# Patient Record
Sex: MALE | Race: WHITE | Employment: OTHER | URBAN - METROPOLITAN AREA
[De-identification: names, ages, dates, MRNs, and addresses within clinical notes are randomized per-mention and may not be internally consistent; named-entity substitution may affect disease eponyms.]

---

## 2017-07-13 DIAGNOSIS — E78.5 HYPERLIPIDEMIA LDL GOAL <100: ICD-10-CM

## 2017-07-13 RX ORDER — SIMVASTATIN 10 MG
10 TABLET ORAL AT BEDTIME
Qty: 90 TABLET | Status: CANCELLED | OUTPATIENT
Start: 2017-07-13

## 2017-07-13 NOTE — TELEPHONE ENCOUNTER
Simvastatin 10 mg     Last Written Prescription Date: 5/2/16  Last Fill Quantity: 90, # refills: 3  Last Office Visit with [facility], [facility] or [facility] prescribing provider: 5/2/16  Next 5 appointments (look out 90 days)     Jul 24, 2017  3:40 PM CDT   SHORT with Tiburcio Landers MD   [facility] ([facility])    [address] [address]   [phone]                   Lab Results   Component Value Date    CHOL 124 05/27/2015     Lab Results   Component Value Date    HDL 34 05/27/2015     Lab Results   Component Value Date    LDL 75 05/27/2015     Lab Results   Component Value Date    TRIG 77 05/27/2015     Lab Results   Component Value Date    CHOLHDLRATIO 3.6 05/27/2015

## 2017-07-14 NOTE — TELEPHONE ENCOUNTER
Simvastatin       Last Written Prescription Date: 05/02/16  Last Fill Quantity: 90, # refills: 3    Last Office Visit with G, P or [facility] prescribing provider:  05/02/16   Future Office Visit:    Next 5 appointments (look out 90 days)     Jul 24, 2017  3:40 PM CDT   SHORT with Tiburcio Landers MD   [facility]    [address] [address]   [phone]                  Cholesterol   Date Value Ref Range Status   05/27/2015 124 <200 mg/dL Final     Comment:     LDL Cholesterol is the primary guide to therapy.   The NCEP recommends further evaluation of: patients with cholesterol greater   than 200 mg/dL if additional risk factors are present, cholesterol greater   than   240 mg/dL, triglycerides greater than 150 mg/dL, or HDL less than 40 mg/dL.       HDL Cholesterol   Date Value Ref Range Status   05/27/2015 34 (L) >40 mg/dL Final     LDL Cholesterol Calculated   Date Value Ref Range Status   05/27/2015 75 0 - 129 mg/dL Final     Comment:     LDL Cholesterol is the primary guide to therapy: LDL-cholesterol goal in high   risk patients is <100 mg/dL and in very high risk patients is <70 mg/dL.       Triglycerides   Date Value Ref Range Status   05/27/2015 77 0 - 150 mg/dL Final     Cholesterol/HDL Ratio   Date Value Ref Range Status   05/27/2015 3.6 0.0 - 5.0 Final     ALT   Date Value Ref Range Status   05/27/2015 35 0 - 70 U/L Final

## 2017-07-17 RX ORDER — SIMVASTATIN 10 MG
TABLET ORAL
Qty: 10 TABLET | Refills: 0 | Status: SHIPPED | OUTPATIENT
Start: 2017-07-17 | End: 2017-07-24

## 2017-07-17 NOTE — TELEPHONE ENCOUNTER
Pt is calling wanting his medication filled today   He is going out of town  Please advise   Poonam Rutherford  Clinic [facility]

## 2017-07-18 NOTE — TELEPHONE ENCOUNTER
Patient due for appt with provider and fasting labs  Called patient, phone busy signal  Recall    Dixie RIVERO Rn

## 2017-07-18 NOTE — TELEPHONE ENCOUNTER
Message has bee left for the pt to return a call back to the clinic.   Please given the below information.     Katie Macias RN

## 2017-07-24 ENCOUNTER — TELEPHONE (OUTPATIENT)
Dept: LAB | Facility: CLINIC | Age: 77
End: 2017-07-24
Payer: COMMERCIAL

## 2017-07-24 ENCOUNTER — OFFICE VISIT (OUTPATIENT)
Dept: FAMILY MEDICINE | Facility: CLINIC | Age: 77
End: 2017-07-24
Payer: COMMERCIAL

## 2017-07-24 VITALS
HEART RATE: 70 BPM | WEIGHT: 211 LBS | HEIGHT: 71 IN | DIASTOLIC BLOOD PRESSURE: 86 MMHG | TEMPERATURE: 97.7 F | BODY MASS INDEX: 29.54 KG/M2 | SYSTOLIC BLOOD PRESSURE: 154 MMHG

## 2017-07-24 DIAGNOSIS — F41.1 GENERALIZED ANXIETY DISORDER: ICD-10-CM

## 2017-07-24 DIAGNOSIS — E78.5 HYPERLIPIDEMIA LDL GOAL <100: ICD-10-CM

## 2017-07-24 DIAGNOSIS — I10 ESSENTIAL HYPERTENSION: ICD-10-CM

## 2017-07-24 DIAGNOSIS — E78.5 HYPERLIPIDEMIA LDL GOAL <100: Primary | ICD-10-CM

## 2017-07-24 LAB
ALT SERPL W P-5'-P-CCNC: 21 U/L (ref 0–70)
AST SERPL W P-5'-P-CCNC: 16 U/L (ref 0–45)
CHOLEST SERPL-MCNC: 169 MG/DL
CREAT SERPL-MCNC: 1.6 MG/DL (ref 0.66–1.25)
GFR SERPL CREATININE-BSD FRML MDRD: 42 ML/MIN/1.7M2
GLUCOSE SERPL-MCNC: 100 MG/DL (ref 70–99)
HDLC SERPL-MCNC: 42 MG/DL
LDLC SERPL CALC-MCNC: 102 MG/DL
NONHDLC SERPL-MCNC: 127 MG/DL
POTASSIUM SERPL-SCNC: 3.7 MMOL/L (ref 3.4–5.3)
TRIGL SERPL-MCNC: 124 MG/DL

## 2017-07-24 PROCEDURE — 82947 ASSAY GLUCOSE BLOOD QUANT: CPT | Performed by: FAMILY MEDICINE

## 2017-07-24 PROCEDURE — 84460 ALANINE AMINO (ALT) (SGPT): CPT | Performed by: FAMILY MEDICINE

## 2017-07-24 PROCEDURE — 80061 LIPID PANEL: CPT | Performed by: FAMILY MEDICINE

## 2017-07-24 PROCEDURE — 99214 OFFICE O/P EST MOD 30 MIN: CPT | Performed by: FAMILY MEDICINE

## 2017-07-24 PROCEDURE — 36415 COLL VENOUS BLD VENIPUNCTURE: CPT | Performed by: FAMILY MEDICINE

## 2017-07-24 PROCEDURE — 84132 ASSAY OF SERUM POTASSIUM: CPT | Performed by: FAMILY MEDICINE

## 2017-07-24 PROCEDURE — 84450 TRANSFERASE (AST) (SGOT): CPT | Performed by: FAMILY MEDICINE

## 2017-07-24 PROCEDURE — 82565 ASSAY OF CREATININE: CPT | Performed by: FAMILY MEDICINE

## 2017-07-24 RX ORDER — PAROXETINE 10 MG/1
10 TABLET, FILM COATED ORAL AT BEDTIME
Qty: 90 TABLET | Refills: 3 | Status: SHIPPED | OUTPATIENT
Start: 2017-07-24 | End: 2018-08-10

## 2017-07-24 RX ORDER — SIMVASTATIN 10 MG
TABLET ORAL
Qty: 90 TABLET | Refills: 3 | Status: SHIPPED | OUTPATIENT
Start: 2017-07-24 | End: 2018-07-17

## 2017-07-24 RX ORDER — LISINOPRIL 10 MG/1
10 TABLET ORAL DAILY
Qty: 90 TABLET | Refills: 3 | Status: SHIPPED | OUTPATIENT
Start: 2017-07-24 | End: 2018-08-10

## 2017-07-24 RX ORDER — HYDROCHLOROTHIAZIDE 50 MG/1
50 TABLET ORAL DAILY
Qty: 90 TABLET | Refills: 3 | Status: SHIPPED | OUTPATIENT
Start: 2017-07-24 | End: 2018-08-10

## 2017-07-24 RX ORDER — LORAZEPAM 0.5 MG/1
0.5 TABLET ORAL EVERY 6 HOURS PRN
Qty: 20 TABLET | Refills: 1 | Status: SHIPPED | OUTPATIENT
Start: 2017-07-24 | End: 2018-08-10

## 2017-07-24 ASSESSMENT — ANXIETY QUESTIONNAIRES
2. NOT BEING ABLE TO STOP OR CONTROL WORRYING: NOT AT ALL
6. BECOMING EASILY ANNOYED OR IRRITABLE: SEVERAL DAYS
5. BEING SO RESTLESS THAT IT IS HARD TO SIT STILL: NOT AT ALL
3. WORRYING TOO MUCH ABOUT DIFFERENT THINGS: NOT AT ALL
7. FEELING AFRAID AS IF SOMETHING AWFUL MIGHT HAPPEN: NOT AT ALL
1. FEELING NERVOUS, ANXIOUS, OR ON EDGE: NOT AT ALL
GAD7 TOTAL SCORE: 1

## 2017-07-24 ASSESSMENT — PATIENT HEALTH QUESTIONNAIRE - PHQ9: 5. POOR APPETITE OR OVEREATING: NOT AT ALL

## 2017-07-24 NOTE — TELEPHONE ENCOUNTER
Discussed this with Dr. Landers.  Lab orders were placed along with a glucose level.  Called lab to let them know the orders were placed.    Liana Macdonald, CMA

## 2017-07-24 NOTE — NURSING NOTE
"Chief Complaint   Patient presents with     Lipids     Recheck on lipid panel done today.     Hypertension     Recheck on blood pressure.     Anxiety     Recheck on anxiety.       Initial BP (!) 159/99  Pulse 70  Temp 97.7  F (36.5  C) (Tympanic)  Ht 5' 10.5\" (1.791 m)  Wt 211 lb (95.7 kg)  BMI 29.85 kg/m2 Estimated body mass index is 29.85 kg/(m^2) as calculated from the following:    Height as of this encounter: 5' 10.5\" (1.791 m).    Weight as of this encounter: 211 lb (95.7 kg).  Medication Reconciliation: complete  "

## 2017-07-24 NOTE — NURSING NOTE
"Chief Complaint   Patient presents with     Lipids     Recheck on lipid panel done today.     Hypertension     Recheck on blood pressure.     Anxiety     Recheck on anxiety.     Colonoscopy     He declines the colonoscopy.       Initial /86  Pulse 70  Temp 97.7  F (36.5  C) (Tympanic)  Ht 5' 10.5\" (1.791 m)  Wt 211 lb (95.7 kg)  BMI 29.85 kg/m2 Estimated body mass index is 29.85 kg/(m^2) as calculated from the following:    Height as of this encounter: 5' 10.5\" (1.791 m).    Weight as of this encounter: 211 lb (95.7 kg).  Medication Reconciliation: complete  "

## 2017-07-24 NOTE — MR AVS SNAPSHOT
After Visit Summary   7/24/2017    Herson Mondragon    MRN: [id number]           Patient Information     Date Of Birth          1940        Visit Information        Provider Department      7/24/2017 3:40 PM Tiburcio Landers MD [facility]        Today's Diagnoses     Hyperlipidemia LDL goal <100        Essential hypertension        Generalized anxiety disorder          Care Instructions    (E78.5) Hyperlipidemia LDL goal <100  Comment:   Plan: simvastatin (ZOCOR) 10 MG tablet, **Lipid panel        reflex to direct LDL FUTURE anytime, **ALT         FUTURE anytime        Stay on the lower chol diet and exercise daily. The LDL was 102 and the goal is under 130. Refill and recheck in one year if doing well.     (I10) Essential hypertension  Comment:   Plan: hydrochlorothiazide (HYDRODIURIL) 50 MG tablet,        lisinopril (PRINIVIL/ZESTRIL) 10 MG tablet,         **Creatinine FUTURE anytime, **Potassium FUTURE        anytime, **Glucose FUTURE anytime        We discussed the BP and the goal for the average at rest is under 130/80. We will increase Lisinopril from 5 mg to 10 mg daily now. Call if any side effects.   Monitor and record the BP. Stay on the Hydrochlorothiazide at 50 mg daily. If the BP is not at the goal in 2-3 weeks then call the clinic RN at [phone] and the Lisinopril will go to 20 mg daily.     (F41.1) Generalized anxiety disorder  Comment:   Plan: PARoxetine (PAXIL) 10 MG tablet, LORazepam         (ATIVAN) 0.5 MG tablet  For the Ativan for anxiety, this is used rarely and the written Rx for #20 and one refill is done today. This usually lasts for one year. Avoid stimulants.   For the Paxil we discussed the dose and will reduce this from 20 mg to 10 mg daily now. Monitor for the symptoms for the next few weeks and if worse in any way then call our RN  And they will do the phone PHQ/YVAN, and compare to today. Use the non drug therapies.           Follow-ups after your  "visit        Future tests that were ordered for you today     Open Future Orders        Priority Expected Expires Ordered    **Creatinine FUTURE anytime Routine 7/24/2017 7/24/2018 7/24/2017    **Potassium FUTURE anytime Routine 7/24/2017 7/24/2018 7/24/2017    **Glucose FUTURE anytime Routine 7/24/2017 7/24/2018 7/24/2017    **Lipid panel reflex to direct LDL FUTURE anytime Routine 7/24/2017 7/24/2018 7/24/2017    **ALT FUTURE anytime Routine 7/24/2017 7/24/2018 7/24/2017            Who to contact     If you have questions or need follow up information about today's clinic visit or your schedule please contact [facility] directly at [phone].  Normal or non-critical lab and imaging results will be communicated to you by [employer], letter or phone within 4 business days after the clinic has received the results. If you do not hear from us within 7 days, please contact the clinic through [employer] or phone. If you have a critical or abnormal lab result, we will notify you by phone as soon as possible.  Submit refill requests through [employer] or call your pharmacy and they will forward the refill request to us. Please allow 3 business days for your refill to be completed.          Additional Information About Your Visit        [employer] Information     [employer] gives you secure access to your electronic health record. If you see a primary care provider, you can also send messages to your care team and make appointments. If you have questions, please call your primary care clinic.  If you do not have a primary care provider, please call [phone] and they will assist you.        Care EveryWhere ID     This is your Care EveryWhere ID. This could be used by other organizations to access your [place] medical records  [id number]        Your Vitals Were     Pulse Temperature Height BMI (Body Mass Index)          70 97.7  F (36.5  C) (Tympanic) 5' 10.5\" (1.791 m) 29.85 kg/m2         Blood Pressure from Last 3 " Encounters:   07/24/17 (!) 159/99   05/02/16 118/69   06/01/15 134/80    Weight from Last 3 Encounters:   07/24/17 211 lb (95.7 kg)   05/02/16 214 lb (97.1 kg)   06/01/15 211 lb (95.7 kg)                 Today's Medication Changes          These changes are accurate as of: 7/24/17  4:25 PM.  If you have any questions, ask your nurse or doctor.               These medicines have changed or have updated prescriptions.        Dose/Directions    lisinopril 10 MG tablet   Commonly known as:  PRINIVIL/ZESTRIL   This may have changed:    - medication strength  - how much to take   Used for:  Essential hypertension   Changed by:  Tiburcio Landers MD        Dose:  10 mg   Take 1 tablet (10 mg) by mouth daily   Quantity:  90 tablet   Refills:  3       PARoxetine 10 MG tablet   Commonly known as:  PAXIL   This may have changed:    - medication strength  - how much to take   Used for:  Generalized anxiety disorder   Changed by:  Tiburcio Landers MD        Dose:  10 mg   Take 1 tablet (10 mg) by mouth At Bedtime   Quantity:  90 tablet   Refills:  3       simvastatin 10 MG tablet   Commonly known as:  ZOCOR   This may have changed:  See the new instructions.   Used for:  Hyperlipidemia LDL goal <100   Changed by:  Tiburcio Landers MD        TAKE ONE TABLET BY MOUTH AT BEDTIME   Quantity:  90 tablet   Refills:  3            Where to get your medicines      These medications were sent to [facility] [address] [place] [address] [address]   [address] [facility] [address]     Phone:  [phone]     hydrochlorothiazide 50 MG tablet    lisinopril 10 MG tablet    PARoxetine 10 MG tablet    simvastatin 10 MG tablet         Some of these will need a paper prescription and others can be bought over the counter.  Ask your nurse if you have questions.     Bring a paper prescription for each of these medications     LORazepam 0.5 MG tablet                Primary Care Provider Office Phone # Fax #    Tiburcio Acosta  MD Anshul [phone]       [facility] [address] [address]        Equal Access to Services     DEANDRE MCKEON : Lily Pantoja, wakamille wall, junta kadaneda beth, kristopher forrestin hayaavanita mcmahonlorenzo selwynramón yessica oden. So [facility] [phone].    ATENCIÓN: Si habla español, tiene a donis disposición servicios gratuitos de asistencia lingüística. Llame al [phone].    We comply with applicable federal civil rights laws and [place] laws. We do not discriminate on the basis of race, color, national origin, age, disability sex, sexual orientation or gender identity.            Thank you!     Thank you for choosing [facility]  for your care. Our goal is always to provide you with excellent care. Hearing back from our patients is one way we can continue to improve our services. Please take a few minutes to complete the written survey that you may receive in the mail after your visit with us. Thank you!             Your Updated Medication List - Protect others around you: Learn how to safely use, store and throw away your medicines at www.disposemymeds.org.          This list is accurate as of: 7/24/17  4:25 PM.  Always use your most recent med list.                   Brand Name Dispense Instructions for use Diagnosis    fish oil-omega-3 fatty acids 1000 MG capsule      Take 2 g by mouth every morning.        fluticasone 50 MCG/ACT spray    FLONASE    16 g    Spray 1-2 sprays into both nostrils daily    Chronic cough       hydrochlorothiazide 50 MG tablet    HYDRODIURIL    90 tablet    Take 1 tablet (50 mg) by mouth daily    Essential hypertension       lisinopril 10 MG tablet    PRINIVIL/ZESTRIL    90 tablet    Take 1 tablet (10 mg) by mouth daily    Essential hypertension       LORazepam 0.5 MG tablet    ATIVAN    20 tablet    Take 1 tablet (0.5 mg) by mouth every 6 hours as needed for anxiety (For flying.)    Generalized anxiety disorder       ONE  DAILY MENS Tabs      Take 1 tablet by mouth daily.        PARoxetine 10 MG tablet    PAXIL    90 tablet    Take 1 tablet (10 mg) by mouth At Bedtime    Generalized anxiety disorder       simvastatin 10 MG tablet    ZOCOR    90 tablet    TAKE ONE TABLET BY MOUTH AT BEDTIME    Hyperlipidemia LDL goal <100

## 2017-07-24 NOTE — PROGRESS NOTES
SUBJECTIVE:                                                    Herson Mondragon is a 77 year old male who presents to clinic today for the following health issues:      Hyperlipidemia Follow-Up      Rate your low fat/cholesterol diet?: good    Taking statin?  Yes, no muscle aches from statin    Other lipid medications/supplements?:  Fish oil/Omega 3, dose  without side effects  Lab Results   Component Value Date    CHOL 169 07/24/2017     Lab Results   Component Value Date    HDL 42 07/24/2017     Lab Results   Component Value Date     07/24/2017     Lab Results   Component Value Date    TRIG 124 07/24/2017     Lab Results   Component Value Date    CHOLHDLRATIO 3.6 05/27/2015       Hypertension Follow-up  Patient presents for evaluation of hypertension.  He indicates that he is feeling well and denies any symptoms referable to his elevated blood pressure. Specifically denies chest pain, palpitations, dyspnea, orthopnea, PND or peripheral edema. Current medication regimen is as listed below. Patient denies any side effects of medication. His average BP has been 135/85    Does have a cough, see below.      Outpatient blood pressures are being checked at home.  Results are usually around 135/85.    Low Salt Diet: no added salt    Medication Followup of Anxiety    Taking Medication as prescribed: yes    Side Effects:  None    Medication Helping Symptoms:  Yes, he would like to discuss about decreasing the dose to 10 mg daily.    Today the PHQ is 2, and the YVAN is 1.     YVAN-7   [employer], 2002; Used with Permission) 5/2/2016   1. Feeling nervous, anxious, or on edge 0   2. Not being able to stop or control worrying 0   3. Worrying too much about different things 0   4. Trouble relaxing 1   5. Being so restless that it is hard to sit still 0   6. Becoming easily annoyed or irritable 0   7. Feeling afraid, as if something awful might happen 0   YVAN-7 Total Score 1     PHQ-9 ([employer]) 6/18/2015   No Interest In  Doing Things 0   Feeling Depressed 0   Trouble Sleeping 1   Tired / No Energy 0   No appetite or Over-Eating 0   Feeling Bad about Self 0   Trouble Concentrating 0   Moving Slow or Restless 0   Suicidal Thoughts 0   Total Score 1        COUGH:  Cough for the past 9 months along with sneezing.    He does have Flonase on his medication list for a cough.  He states he doesn't remember getting this medication and is not using it.  He is concerned about his symptoms he is having.  Not sure if medication related?      BACK PAIN:  Here to discuss about back pain, right side.  It is off and on.  He can go 3-4 days with no symptoms and then will have symptoms.     Pain can radiate down the leg at times to the foot.  Can have a hard time walking and will have to rest.  States at times it feels like a bone pushes out in the back area and when pushing on that the pain can feel better.        Amount of exercise or physical activity: No daily exercise, is active around the house.    Problems taking medications regularly: No    Medication side effects: His wife is not sure if his cold symptoms could be related to cough he has had for 9 months.  Diet: low fat/cholesterol and no added salt.        Current Outpatient Prescriptions:      simvastatin (ZOCOR) 10 MG tablet, TAKE ONE TABLET BY MOUTH AT BEDTIME, Disp: 90 tablet, Rfl: 3     hydrochlorothiazide (HYDRODIURIL) 50 MG tablet, Take 1 tablet (50 mg) by mouth daily, Disp: 90 tablet, Rfl: 3     PARoxetine (PAXIL) 10 MG tablet, Take 1 tablet (10 mg) by mouth At Bedtime, Disp: 90 tablet, Rfl: 3     lisinopril (PRINIVIL/ZESTRIL) 10 MG tablet, Take 1 tablet (10 mg) by mouth daily, Disp: 90 tablet, Rfl: 3     LORazepam (ATIVAN) 0.5 MG tablet, Take 1 tablet (0.5 mg) by mouth every 6 hours as needed for anxiety (For flying.), Disp: 20 tablet, Rfl: 1     Multiple Vitamins-Minerals (ONE DAILY MENS) TABS, Take 1 tablet by mouth daily., Disp: , Rfl:      fish oil-omega-3 fatty acids (FISH OIL)  "1000 MG capsule, Take 2 g by mouth every morning., Disp: , Rfl:      [DISCONTINUED] simvastatin (ZOCOR) 10 MG tablet, TAKE ONE TABLET BY MOUTH AT BEDTIME, Disp: 10 tablet, Rfl: 0     fluticasone (FLONASE) 50 MCG/ACT nasal spray, Spray 1-2 sprays into both nostrils daily (Patient not taking: Reported on 7/24/2017), Disp: 16 g, Rfl: 11     [DISCONTINUED] hydrochlorothiazide (HYDRODIURIL) 50 MG tablet, Take 1 tablet (50 mg) by mouth daily, Disp: 90 tablet, Rfl: 3     [DISCONTINUED] PARoxetine (PAXIL) 20 MG tablet, Take 1 tablet (20 mg) by mouth At Bedtime, Disp: 90 tablet, Rfl: 3     [DISCONTINUED] lisinopril (PRINIVIL,ZESTRIL) 5 MG tablet, Take 1 tablet (5 mg) by mouth daily, Disp: 90 tablet, Rfl: 3     [DISCONTINUED] LORazepam (ATIVAN) 0.5 MG tablet, Take 1 tablet (0.5 mg) by mouth every 6 hours as needed for anxiety (For flying.), Disp: 20 tablet, Rfl: 1    Patient Active Problem List   Diagnosis     Essential hypertension     Syncope and collapse     Atrial fibrillation (H)     Hyperlipidemia LDL goal <130     Generalized anxiety disorder     Shoulder arthritis     Sinus bradycardia       Blood pressure 154/86, pulse 70, temperature 97.7  F (36.5  C), temperature source Tympanic, height 5' 10.5\" (1.791 m), weight 211 lb (95.7 kg).    Exam:  GENERAL APPEARANCE: healthy, alert and no distress  EYES: EOMI,  PERRL  NECK: no adenopathy, no asymmetry, masses, or scars and thyroid normal to palpation  RESP: lungs clear to auscultation - no rales, rhonchi or wheezes  CV: regular rates and rhythm, normal S1 S2, no S3 or S4 and no murmur, click or rub -  MS: extremities normal- no gross deformities noted, no evidence of inflammation in joints, FROM in all extremities.  SKIN: no suspicious lesions or rashes  PSYCH: mentation appears normal and affect normal/bright      (E78.5) Hyperlipidemia LDL goal <100  Comment:   Plan: simvastatin (ZOCOR) 10 MG tablet, **Lipid panel        reflex to direct LDL FUTURE anytime, **ALT        "  FUTURE anytime        Stay on the lower chol diet and exercise daily. The LDL was 102 and the goal is under 130. Refill and recheck in one year if doing well.     (I10) Essential hypertension  Comment:   Plan: hydrochlorothiazide (HYDRODIURIL) 50 MG tablet,        lisinopril (PRINIVIL/ZESTRIL) 10 MG tablet,         **Creatinine FUTURE anytime, **Potassium FUTURE        anytime, **Glucose FUTURE anytime        We discussed the BP and the goal for the average at rest is under 130/80. We will increase Lisinopril from 5 mg to 10 mg daily now. Call if any side effects.   Monitor and record the BP. Stay on the Hydrochlorothiazide at 50 mg daily. If the BP is not at the goal in 2-3 weeks then call the clinic RN at [phone] and the Lisinopril will go to 20 mg daily.     (F41.1) Generalized anxiety disorder  Comment:   Plan: PARoxetine (PAXIL) 10 MG tablet, LORazepam         (ATIVAN) 0.5 MG tablet  For the Ativan for anxiety, this is used rarely and the written Rx for #20 and one refill is done today. This usually lasts for one year. Avoid stimulants.   For the Paxil we discussed the dose and will reduce this from 20 mg to 10 mg daily now. Monitor for the symptoms for the next few weeks and if worse in any way then call our RN  And they will do the phone PHQ/YVAN, and compare to today. Use the non drug therapies.     Tiburcio Landers

## 2017-07-24 NOTE — PATIENT INSTRUCTIONS
(E78.5) Hyperlipidemia LDL goal <100  Comment:   Plan: simvastatin (ZOCOR) 10 MG tablet, **Lipid panel        reflex to direct LDL FUTURE anytime, **ALT         FUTURE anytime        Stay on the lower chol diet and exercise daily. The LDL was 102 and the goal is under 130. Refill and recheck in one year if doing well.     (I10) Essential hypertension  Comment:   Plan: hydrochlorothiazide (HYDRODIURIL) 50 MG tablet,        lisinopril (PRINIVIL/ZESTRIL) 10 MG tablet,         **Creatinine FUTURE anytime, **Potassium FUTURE        anytime, **Glucose FUTURE anytime        We discussed the BP and the goal for the average at rest is under 130/80. We will increase Lisinopril from 5 mg to 10 mg daily now. Call if any side effects.   Monitor and record the BP. Stay on the Hydrochlorothiazide at 50 mg daily. If the BP is not at the goal in 2-3 weeks then call the clinic RN at [phone] and the Lisinopril will go to 20 mg daily.     (F41.1) Generalized anxiety disorder  Comment:   Plan: PARoxetine (PAXIL) 10 MG tablet, LORazepam         (ATIVAN) 0.5 MG tablet  For the Ativan for anxiety, this is used rarely and the written Rx for #20 and one refill is done today. This usually lasts for one year. Avoid stimulants.   For the Paxil we discussed the dose and will reduce this from 20 mg to 10 mg daily now. Monitor for the symptoms for the next few weeks and if worse in any way then call our RN  And they will do the phone PHQ/YVAN, and compare to today. Use the non drug therapies.

## 2017-07-25 ASSESSMENT — PATIENT HEALTH QUESTIONNAIRE - PHQ9: SUM OF ALL RESPONSES TO PHQ QUESTIONS 1-9: 2

## 2017-07-25 ASSESSMENT — ANXIETY QUESTIONNAIRES: GAD7 TOTAL SCORE: 1

## 2017-08-14 ENCOUNTER — MYC MEDICAL ADVICE (OUTPATIENT)
Dept: FAMILY MEDICINE | Facility: CLINIC | Age: 77
End: 2017-08-14

## 2017-08-14 ENCOUNTER — OFFICE VISIT (OUTPATIENT)
Dept: FAMILY MEDICINE | Facility: CLINIC | Age: 77
End: 2017-08-14
Payer: COMMERCIAL

## 2017-08-14 VITALS
DIASTOLIC BLOOD PRESSURE: 95 MMHG | WEIGHT: 209.4 LBS | HEIGHT: 71 IN | TEMPERATURE: 99.2 F | BODY MASS INDEX: 29.31 KG/M2 | SYSTOLIC BLOOD PRESSURE: 164 MMHG | HEART RATE: 71 BPM

## 2017-08-14 DIAGNOSIS — G57.01 PIRIFORMIS SYNDROME, RIGHT: ICD-10-CM

## 2017-08-14 DIAGNOSIS — M54.31 SCIATICA OF RIGHT SIDE: Primary | ICD-10-CM

## 2017-08-14 PROCEDURE — 99214 OFFICE O/P EST MOD 30 MIN: CPT | Performed by: NURSE PRACTITIONER

## 2017-08-14 RX ORDER — TRAMADOL HYDROCHLORIDE 50 MG/1
50 TABLET ORAL EVERY 8 HOURS PRN
Qty: 20 TABLET | Refills: 0 | Status: SHIPPED | OUTPATIENT
Start: 2017-08-14 | End: 2017-10-13

## 2017-08-14 ASSESSMENT — PAIN SCALES - GENERAL: PAINLEVEL: WORST PAIN (10)

## 2017-08-14 NOTE — NURSING NOTE
"Chief Complaint   Patient presents with     Musculoskeletal Problem     Right Buttocks and leg Pain , pain radiates down leg into toes. Patient thinks it is a pinched nerve.     Health Maintenance     reminded due for prevnar 13, he will come back to get this done .        Initial BP (!) 164/95 (BP Location: Left arm, Patient Position: Chair, Cuff Size: Adult Large)  Pulse 71  Temp 99.2  F (37.3  C) (Tympanic)  Ht 5' 10.5\" (1.791 m)  Wt 209 lb 6.4 oz (95 kg)  BMI 29.62 kg/m2 Estimated body mass index is 29.62 kg/(m^2) as calculated from the following:    Height as of this encounter: 5' 10.5\" (1.791 m).    Weight as of this encounter: 209 lb 6.4 oz (95 kg).  Medication Reconciliation: complete    "

## 2017-08-14 NOTE — PROGRESS NOTES
"  SUBJECTIVE:                                                    Herson Mondragon is a 77 year old male who presents to clinic today for the following health issues:  Severe right leg pain which has been going on for over the last 6 months and worsening in the last 2 months. Patient reported of pain shooting down to ankle and has not been able to bear weight for the last 3-4 days. The pain is better with sitting down. Patient has been in bed for last 4 days and having trouble sleeping due to the pain. Patient had a tape on the right glute which patient mentions that if pressed had at that site pain subsides and patient is even able to walk few steps without pain. Patient also mentions that he feels something is bulging out at taped site on the glute with walking which is not present other side of the glute. Patient thinks that it is a pinched nerve. Patient has been using Advil with no help, but warm pack helps a little.       Joint Pain    Onset: 6 months, Started Getting Worse x 2 months    Description:   Location: Right Side Buttocks, Radiates Down Leg Into Toes   Character: Constant Sharp pain, Burning . Pain has gotten so bad he has almost \"passed out\"     Intensity: severe    Progression of Symptoms: worse    Accompanying Signs & Symptoms:  Other symptoms: radiation of pain to toes     History:   Previous similar pain: YES      Precipitating factors:   Trauma or overuse: no   Gets worse with changes in position. Hard for him to stand or walk     Alleviating factors:  Improved by: Pushing with his finger on a certain spot on his buttocks.    Therapies Tried and outcome: Heat- helps a little. Pain patches- alleviates pain a little. Advil 600mg - no relief. He states when he pushes on a certain area on his Right Buttocks The pain goes away .       Problem list and histories reviewed & adjusted, as indicated.  Additional history: as documented    Labs reviewed in EPIC    Reviewed and updated as needed this visit " [facility]  [facility]       Reviewed and updated as needed this visit by Provider         ROS:  Constitutional, HEENT, cardiovascular, pulmonary, gi and gu systems are negative, except as otherwise noted.      OBJECTIVE:   BP (!) 164/95 (BP Location: Left arm, Patient Position: Chair, Cuff Size: Adult Large)  Pulse 71  Temp 99.2  F (37.3  C) (Tympanic)  Ht 5' 10.5\" (1.791 m)  Wt 209 lb 6.4 oz (95 kg)  BMI 29.62 kg/m2  Body mass index is 29.62 kg/(m^2).  GENERAL: healthy, alert and no distress  MS: Painful bearing weight on right leg. Straight leg raise negative. Excessive right medial gluteal muscle laxity. Unable to perform the trendelenburg sign to evaluate gluteal muscle weakness since patient unable to bear weight on the right leg due to pain.   PSYCH: mentation appears normal, affect normal/bright    Diagnostic Test Results:  none     ASSESSMENT/PLAN:     1. Sciatica of right side  Painful bearing weight on left leg. Excessive left medial gluteal muscle laxity.Straight leg raise negative. Unable to perform the trendelenburg sign to evaluate gluteal muscle weakness but unable to perform due to unable to bear weight on left leg. Will try physical therapy. Offered short-term oral steroid but patient and spouse declined due to its side effects. Patient also requested for some stronger pain medications so that he can have a good night sleep.  - PHYSICAL THERAPY REFERRAL  - traMADol (ULTRAM) 50 MG tablet; Take 1 tablet (50 mg) by mouth every 8 hours as needed for pain (do not take together with Lorazepam) maximum 3 tablet(s) per day  Dispense: 20 tablet; Refill: 0    2. Piriformis syndrome, right  See above  - PHYSICAL THERAPY REFERRAL  - traMADol (ULTRAM) 50 MG tablet; Take 1 tablet (50 mg) by mouth every 8 hours as needed for pain (do not take together with Lorazepam) maximum 3 tablet(s) per day  Dispense: 20 tablet; Refill: 0  - Provided written education about piriformis syndrome  -if no " improvement recommend to follow up with ortho for possible steroid injection     See Patient Instructions    CANDICE Perez [facility]

## 2017-08-14 NOTE — MR AVS SNAPSHOT
"              After Visit Summary   8/14/2017    Herson Mondragon    MRN: [id number]           Patient Information     Date Of Birth          1940        Visit Information        Provider Department      8/14/2017 2:40 PM Dai Doherty APRN [facility]        Today's Diagnoses     Sciatica of right side    -  1      Care Instructions    Piriformis syndrome    Piriformis syndrome -- Piriformis syndrome is a controversial entrapment neuropathy (figure 14 and figure 15 and figure 11) [21,22]. Controversy is due to the limited research about the condition and the difficulty of making the diagnosis, particularly as symptoms mimic many other more common diagnoses. Piriformis syndrome may account for 0.3 to 6 percent of sciatic-like syndromes [23]. The sciatic nerve normally passes inferior to the piriformis muscle. Entrapment of the sciatic nerve may develop following trauma to the buttocks or piriformis muscle strain causing scarring and fibrosis around the nerve, or due to the structure of the piriformis, such as when branches of the nerve pass through a bifid piriformis muscle [24]. During downhill running or sprinting, the piriformis muscle undergoes eccentric contraction and some runners may develop the syndrome via this mechanism. The most common presenting symptom is buttock pain of gradual onset that increases with sitting [25]. The \"wallet sign\" associated with the syndrome is when a male patient finds he can no longer sit on his wallet without causing symptoms. Paresthesias may develop, but the classic radicular symptoms of sciatica are not common.  Clinically, the diagnosis of piriformis syndrome is considered when the classic signs of a lumbar radiculopathy elicited by provocative testing are absent, neurologic examination is normal, and other causes of gluteal and sacroiliac pain are ruled out. A provocative test (Freiburgs test) suggesting piriformis syndrome is performed by " placing the hip in extension and internal rotation, and then resisting external rotation. Pain or sciatic symptoms denotes a positive test [26]. Another test (Pace sign) involves having the seated patient resist abduction and external rotation. Pain and reproduction of symptoms marks a positive test. When necessary, plain radiographs and MRI of the hip and pelvis are obtained to rule out other causes of symptoms. EMG and nerve conduction studies are rarely positive in piriformis syndrome but can be useful for eliminating other diagnostic possibilities.  Treatment begins with physical therapy involving strengthening of the pelvic and hip region and stretching of the piriformis. Appropriate analgesics for neuropathic pain are taken as needed. Physical therapy is effective in the majority of cases [25]. Ultrasound-guided glucocorticoid injections have been beneficial in some cases, and botulin toxin injections have also been used. Surgery (typically a piriformis tenotomy) may be considered if symptoms are debilitating and persist despite conservative therapy.    Treatment physical therapy  Tramadol 50 mg 3 times daily as needed for pain  Tylenol 500 mg 4 times daily as needed         Thank you for choosing [facility].  You may be receiving a survey in the mail from [facility] regarding your visit today.  Please take a few minutes to complete and return the survey to let us know how we are doing.      If you have questions or concerns, please contact us via [facility] or you can contact your care team at [phone].    Our Clinic hours are:  Monday 6:40 am  to 7:00 pm  Tuesday -Friday 6:40 am to 5:00 pm    The [place] outpatient lab hours are:  Monday - Friday 6:10 am to 4:45 pm  Saturdays 7:00 am to 11:00 am  Appointments are required, call [phone]    If you have clinical questions after hours or would like to schedule an appointment,  call the clinic at [phone].            Follow-ups after your visit    "     Additional Services     PHYSICAL THERAPY REFERRAL       This therapy referral will be filtered to a centralized scheduling office at [facility] and the patient will receive a call to schedule an appointment at a [place] location most convenient for them.   [facility] provides Physical Therapy evaluation and treatment and many specialty services across the [place] system.  If requesting a specialty program, please choose from the list below.    If you have not heard from the scheduling office within 2 business days, please call [phone] for all locations, with the exception of [place], please call [phone].  Treatment: Evaluation & Treatment  Special Instructions/Modalities:   Special Programs: None    Please be aware that coverage of these services is subject to the terms and limitations of your health insurance plan.  Call member services at your health plan with any benefit or coverage questions.      **Note to Provider:  If you are referring outside of [place] for the therapy appointment, please list the name of the location in the \"special instructions\" above, print the referral and give to the patient to schedule the appointment.                  Who to contact     If you have questions or need follow up information about today's clinic visit or your schedule please contact [facility] directly at [phone].  Normal or non-critical lab and imaging results will be communicated to you by MyChart, letter or phone within 4 business days after the clinic has received the results. If you do not hear from us within 7 days, please contact the clinic through MyChart or phone. If you have a critical or abnormal lab result, we will notify you by phone as soon as possible.  Submit refill requests through [facility] or call your pharmacy and they will forward the refill request to us. Please allow 3 business days for your refill to be completed.          " "Additional Information About Your Visit        MyChart Information     [facility] gives you secure access to your electronic health record. If you see a primary care provider, you can also send messages to your care team and make appointments. If you have questions, please call your primary care clinic.  If you do not have a primary care provider, please call [phone] and they will assist you.        Care EveryWhere ID     This is your Care EveryWhere ID. This could be used by other organizations to access your [facility] medical records  [id number]        Your Vitals Were     Pulse Temperature Height BMI (Body Mass Index)          71 99.2  F (37.3  C) (Tympanic) 5' 10.5\" (1.791 m) 29.62 kg/m2         Blood Pressure from Last 3 Encounters:   08/14/17 (!) 164/95   07/24/17 154/86   05/02/16 118/69    Weight from Last 3 Encounters:   08/14/17 209 lb 6.4 oz (95 kg)   07/24/17 211 lb (95.7 kg)   05/02/16 214 lb (97.1 kg)              We Performed the Following     PHYSICAL THERAPY REFERRAL          Today's Medication Changes          These changes are accurate as of: 8/14/17  3:09 PM.  If you have any questions, ask your nurse or doctor.               Start taking these medicines.        Dose/Directions    traMADol 50 MG tablet   Commonly known as:  ULTRAM   Used for:  Sciatica of right side        Dose:  50 mg   Take 1 tablet (50 mg) by mouth every 8 hours as needed for pain (do not take together with Lorazepam) maximum 3 tablet(s) per day   Quantity:  20 tablet   Refills:  0            Where to get your medicines      Some of these will need a paper prescription and others can be bought over the counter.  Ask your nurse if you have questions.     Bring a paper prescription for each of these medications     traMADol 50 MG tablet                Primary Care Provider Office Phone # Fax #    Tiburcio Landers [phone] [address] [facility] [address]        Equal Access to Services     " [facility]RE [facility] : Hadii aad ku hay Sonaldo, waaxda luqadaha, qaybta kaalmada adejayda, kristopher wallace prietovanita grigsby mariliasoniay juan . So [facility] [phone].    ATENCIÓN: Si habla erin, tiene a donis disposición servicios gratuitos de asistencia lingüística. Llame al [phone].    We comply with applicable federal civil rights laws and [place] laws. We do not discriminate on the basis of race, color, national origin, age, disability sex, sexual orientation or gender identity.            Thank you!     Thank you for choosing [facility]  for your care. Our goal is always to provide you with excellent care. Hearing back from our patients is one way we can continue to improve our services. Please take a few minutes to complete the written survey that you may receive in the mail after your visit with us. Thank you!             Your Updated Medication List - Protect others around you: Learn how to safely use, store and throw away your medicines at www.disposemymeds.org.          This list is accurate as of: 8/14/17  3:09 PM.  Always use your most recent med list.                   Brand Name Dispense Instructions for use Diagnosis    fish oil-omega-3 fatty acids 1000 MG capsule      Take 2 g by mouth every morning.        hydrochlorothiazide 50 MG tablet    HYDRODIURIL    90 tablet    Take 1 tablet (50 mg) by mouth daily    Essential hypertension       lisinopril 10 MG tablet    PRINIVIL/ZESTRIL    90 tablet    Take 1 tablet (10 mg) by mouth daily    Essential hypertension       LORazepam 0.5 MG tablet    ATIVAN    20 tablet    Take 1 tablet (0.5 mg) by mouth every 6 hours as needed for anxiety (For flying.)    Generalized anxiety disorder       ONE DAILY MENS Tabs      Take 1 tablet by mouth daily.        PARoxetine 10 MG tablet    PAXIL    90 tablet    Take 1 tablet (10 mg) by mouth At Bedtime    Generalized anxiety disorder       simvastatin 10 MG tablet    ZOCOR    90 tablet    TAKE ONE TABLET BY MOUTH  AT BEDTIME    Hyperlipidemia LDL goal <100       traMADol 50 MG tablet    ULTRAM    20 tablet    Take 1 tablet (50 mg) by mouth every 8 hours as needed for pain (do not take together with Lorazepam) maximum 3 tablet(s) per day    Sciatica of right side

## 2017-08-14 NOTE — PATIENT INSTRUCTIONS
"Piriformis syndrome    Piriformis syndrome   Piriformis syndrome is a controversial entrapment neuropathy (figure 14 and figure 15 and figure 11) [21,22]. Controversy is due to the limited research about the condition and the difficulty of making the diagnosis, particularly as symptoms mimic many other more common diagnoses. Piriformis syndrome may account for 0.3 to 6 percent of sciatic-like syndromes [23]. The sciatic nerve normally passes inferior to the piriformis muscle. Entrapment of the sciatic nerve may develop following trauma to the buttocks or piriformis muscle strain causing scarring and fibrosis around the nerve, or due to the structure of the piriformis, such as when branches of the nerve pass through a bifid piriformis muscle [24]. During downhill running or sprinting, the piriformis muscle undergoes eccentric contraction and some runners may develop the syndrome via this mechanism. The most common presenting symptom is buttock pain of gradual onset that increases with sitting [25]. The \"wallet sign\" associated with the syndrome is when a male patient finds he can no longer sit on his wallet without causing symptoms. Paresthesias may develop, but the classic radicular symptoms of sciatica are not common.  Clinically, the diagnosis of piriformis syndrome is considered when the classic signs of a lumbar radiculopathy elicited by provocative testing are absent, neurologic examination is normal, and other causes of gluteal and sacroiliac pain are ruled out. A provocative test (Freiburgs test) suggesting piriformis syndrome is performed by placing the hip in extension and internal rotation, and then resisting external rotation. Pain or sciatic symptoms denotes a positive test [26]. Another test (Pace sign) involves having the seated patient resist abduction and external rotation. Pain and reproduction of symptoms marks a positive test. When necessary, plain radiographs and MRI of the hip and pelvis are " obtained to rule out other causes of symptoms. EMG and nerve conduction studies are rarely positive in piriformis syndrome but can be useful for eliminating other diagnostic possibilities.  Treatment begins with physical therapy involving strengthening of the pelvic and hip region and stretching of the piriformis. Appropriate analgesics for neuropathic pain are taken as needed. Physical therapy is effective in the majority of cases [25]. Ultrasound-guided glucocorticoid injections have been beneficial in some cases, and botulin toxin injections have also been used. Surgery (typically a piriformis tenotomy) may be considered if symptoms are debilitating and persist despite conservative therapy.    Treatment physical therapy  Tramadol 50 mg 3 times daily as needed for pain  Tylenol 500 mg 4 times daily as needed         Thank you for choosing [facility].  You may be receiving a survey in the mail from [employer] regarding your visit today.  Please take a few minutes to complete and return the survey to let us know how we are doing.      If you have questions or concerns, please contact us via [employer] or you can contact your care team at [phone].    Our Clinic hours are:  Monday 6:40 am  to 7:00 pm  Tuesday -Friday 6:40 am to 5:00 pm    The [place] outpatient lab hours are:  Monday - Friday 6:10 am to 4:45 pm  Saturdays 7:00 am to 11:00 am  Appointments are required, call [phone]    If you have clinical questions after hours or would like to schedule an appointment,  call the clinic at [phone].

## 2017-08-16 ENCOUNTER — ALLIED HEALTH/NURSE VISIT (OUTPATIENT)
Dept: FAMILY MEDICINE | Facility: CLINIC | Age: 77
End: 2017-08-16
Payer: COMMERCIAL

## 2017-08-16 ENCOUNTER — HOSPITAL ENCOUNTER (OUTPATIENT)
Dept: PHYSICAL THERAPY | Facility: CLINIC | Age: 77
Setting detail: THERAPIES SERIES
End: 2017-08-16
Attending: NURSE PRACTITIONER
Payer: MEDICARE

## 2017-08-16 DIAGNOSIS — M54.31 SCIATICA OF RIGHT SIDE: Primary | ICD-10-CM

## 2017-08-16 PROCEDURE — 40000718 ZZHC STATISTIC PT DEPARTMENT ORTHO VISIT: Performed by: PHYSICAL THERAPIST

## 2017-08-16 PROCEDURE — G8978 MOBILITY CURRENT STATUS: HCPCS | Mod: GP,CL | Performed by: PHYSICAL THERAPIST

## 2017-08-16 PROCEDURE — 97110 THERAPEUTIC EXERCISES: CPT | Mod: GP | Performed by: PHYSICAL THERAPIST

## 2017-08-16 PROCEDURE — 99207 ZZC NO CHARGE NURSE ONLY: CPT

## 2017-08-16 PROCEDURE — G8979 MOBILITY GOAL STATUS: HCPCS | Mod: GP,CI | Performed by: PHYSICAL THERAPIST

## 2017-08-16 PROCEDURE — 97140 MANUAL THERAPY 1/> REGIONS: CPT | Mod: GP | Performed by: PHYSICAL THERAPIST

## 2017-08-16 PROCEDURE — 97161 PT EVAL LOW COMPLEX 20 MIN: CPT | Mod: GP | Performed by: PHYSICAL THERAPIST

## 2017-08-16 NOTE — PROGRESS NOTES
Herson Mondragon   PHYSICAL THERAPY EVALUATION    08/16/17 1200   General Information   Type of Visit Initial OP Ortho PT Evaluation   Start of Care Date 08/16/17   Referring Physician Dai Doherty NP   Patient/Family Goals Statement decrease pain, be able to walk   Orders Evaluate and Treat   Date of Order 08/14/17   Insurance Type Medicare   Medical Diagnosis R sciatica   Surgical/Medical history reviewed Yes  (HTN)   Body Part(s)   Body Part(s) Lumbar Spine/SI   Presentation and Etiology   Pertinent history of current problem (include personal factors and/or comorbidities that impact the POC) R butt and thigh down to calf, sx immediately upon standing.  Several years ago had L LE sx.  Feels this is his muscle pushing on the sciatic nerve, if he pushes on it he can wlak little keeley. States the muscle actually bulges out, can feel it and pushes hard to push it back in. Sx better sitting, lying on side.  Sx standing, walking, turning over in bed.  Slowly progressing over 6 months, Sx increase bad 2 weeks ago.  Pt points to very low in buttock as site he pushes on when it is hurting, just above ischial tub  Pain 8-10/10 when bad   Onset date of current episode/exacerbation 08/01/17   Prior Level of Function   Functional Level Prior Comment cabin and home/ yard work, boats, put in docks   Current Level of Function   Patient role/employment history F. Retired   Living environment [place]   Current equipment-Gait/Locomotion Standard cane  (arrived sitting in wheelchair)   Fall Risk Screen   Fall screen completed by PT   Per patient - Fall 2 or more times in past year? No   Per patient - Fall with injury in past year? No   Is patient a fall risk? No   System Outcome Measures   Outcome Measures Low Back Pain (see [place] and José Miguel)   Lumbar Spine/SI Objective Findings   Posture stands WB L LE mostly, decreasedc lumbar lordosis, slighlty fwd bent posture   Gait/Locomotion slow and cautious once he knew pain  "was not present   Flexion % relieved sx, R butt pain moving back into upright position   Extension ROM 50% no increase in sx, pain moving back to upright position   Right Side Bending ROM 25% stiff, increased R butt pain   Left Side Bending ROM 25% stiff, no butt pain   Pelvic Screen appeared level supine   Hip Screen B hip ROM ER 25-30*, IR 10*, EXT 0* B   Hip Flexion (L2) Strength 5   Hip Abduction Strength 5   Lumbar/Hip/Knee/Foot Strength Comments hip ER in clam shell position 4+/5, no pain   Hamstring Flexibility 30* B   Hip Flexor Flexibility tight 0* B   Quadricep Flexibility tight , prone KF 90-95* B   Piriformis Flexibility WFL, no pain,    SLR neg   Crossover SLR neg   Neurological Testing Comments neural tension neg   Palpation no palpable tenderness, reports area of sacrotuberous ligament as closest site to \"where muscle bulges out when painful\"   Planned Therapy Interventions   Planned Therapy Interventions strengthening;stretching;manual therapy   Clinical Impression   Criteria for Skilled Therapeutic Interventions Met yes, treatment indicated   PT Diagnosis R LE radicular sx, morelikely from the spine than an isolated muscle   Functional limitations due to impairments walking, standing, turning in  bed   Clinical Presentation Stable/Uncomplicated   Clinical Decision Making (Complexity) Low complexity   Therapy Frequency 1 time/week   Predicted Duration of Therapy Intervention (days/wks) 4wk   Risk & Benefits of therapy have been explained Yes   Patient, Family & other staff in agreement with plan of care Yes   Education Assessment   Preferred Learning Style Listening;Pictures/video   Barriers to Learning No barriers   ORTHO GOALS   PT Ortho Eval Goals 1;2   Ortho Goal 1   Goal Description pt able to walk household distances without cane and pain no greater than 2/10 in 4wk   Target Date 09/16/17   Ortho Goal 2   Goal Description pt will be ree to return to yardwork in 4wk   Target Date " 09/16/17   Total Evaluation Time   Total Evaluation Time 20   Kris Hoenk, PT #[id number]  [facility]

## 2017-08-16 NOTE — MR AVS SNAPSHOT
After Visit Summary   8/16/2017    Herson Mondragon    MRN: [id number]           Patient Information     Date Of Birth          1940        Visit Information        Provider Department      8/16/2017 2:30 PM AYAKA ECKERT FP/IM RN [facility]        Today's Diagnoses     Sciatica of right side    -  1       Follow-ups after your visit        Your next 10 appointments already scheduled     Aug 16, 2017  2:30 PM CDT   Nurse Only with FL [place] FP/IM RN   [facility]    [address] [id number] [phone]            Aug 23, 2017 11:00 AM CDT   Ortho Treatment with Kris Hoenk, PT   [facility] ([facility])    [address]  [address]  [place] [id number] [phone]              Who to contact     If you have questions or need follow up information about today's clinic visit or your schedule please contact [facility] directly at [phone].  Normal or non-critical lab and imaging results will be communicated to you by Derivixhart, letter or phone within 4 business days after the clinic has received the results. If you do not hear from us within 7 days, please contact the clinic through Derivixhart or phone. If you have a critical or abnormal lab result, we will notify you by phone as soon as possible.  Submit refill requests through Hordspot or call your pharmacy and they will forward the refill request to us. Please allow 3 business days for your refill to be completed.          Additional Information About Your Visit        MyChart Information     Hordspot gives you secure access to your electronic health record. If you see a primary care provider, you can also send messages to your care team and make appointments. If you have questions, please call your primary care clinic.  If you do not have a primary care provider, please call [phone] and they will assist you.        Care  EveryWhere ID     This is your Care EveryWhere ID. This could be used by other organizations to access your [place] medical records  [id number]         Blood Pressure from Last 3 Encounters:   08/14/17 (!) 164/95   07/24/17 154/86   05/02/16 118/69    Weight from Last 3 Encounters:   08/14/17 209 lb 6.4 oz (95 kg)   07/24/17 211 lb (95.7 kg)   05/02/16 214 lb (97.1 kg)              Today, you had the following     No orders found for display       Primary Care Provider Office Phone # Fax #    Tiburcio Dave Landers [phone] [address] [address]        Equal Access to Services     DEANDRE MCKEON : Hadii aad ku hadasho Sonaldo, waaxda luqadaha, qaybta kaalmada adeegyada, kristopher oden. So [facility] [phone].    ATENCIÓN: Si habla español, tiene a donis disposición servicios gratuitos de asistencia lingüística. [facility] [phone].    We comply with applicable federal civil rights laws and [place] laws. We do not discriminate on the basis of race, color, national origin, age, disability sex, sexual orientation or gender identity.            Thank you!     Thank you for choosing [facility]  for your care. Our goal is always to provide you with excellent care. Hearing back from our patients is one way we can continue to improve our services. Please take a few minutes to complete the written survey that you may receive in the mail after your visit with us. Thank you!             Your Updated Medication List - Protect others around you: Learn how to safely use, store and throw away your medicines at www.disposemymeds.org.          This list is accurate as of: 8/16/17  2:17 PM.  Always use your most recent med list.                   Brand Name Dispense Instructions for use Diagnosis    fish oil-omega-3 fatty acids 1000 MG capsule      Take 2 g by mouth every morning.        hydrochlorothiazide 50 MG tablet    HYDRODIURIL    90 tablet     Take 1 tablet (50 mg) by mouth daily    Essential hypertension       lisinopril 10 MG tablet    PRINIVIL/ZESTRIL    90 tablet    Take 1 tablet (10 mg) by mouth daily    Essential hypertension       LORazepam 0.5 MG tablet    ATIVAN    20 tablet    Take 1 tablet (0.5 mg) by mouth every 6 hours as needed for anxiety (For flying.)    Generalized anxiety disorder       ONE DAILY MENS Tabs      Take 1 tablet by mouth daily.        PARoxetine 10 MG tablet    PAXIL    90 tablet    Take 1 tablet (10 mg) by mouth At Bedtime    Generalized anxiety disorder       simvastatin 10 MG tablet    ZOCOR    90 tablet    TAKE ONE TABLET BY MOUTH AT BEDTIME    Hyperlipidemia LDL goal <100       traMADol 50 MG tablet    ULTRAM    20 tablet    Take 1 tablet (50 mg) by mouth every 8 hours as needed for pain (do not take together with Lorazepam) maximum 3 tablet(s) per day    Sciatica of right side, Piriformis syndrome, right

## 2017-08-16 NOTE — NURSING NOTE
Wife here, inquiring about the handicapped permit they have requested.   Herson was in the car, she took the form to him to fill out and sign the top portion and will bring it back, I will put it back in Ene's basket and advised wife we will notify her when this has been completed. Thanks,   Chantale Stoner RNC

## 2017-08-16 NOTE — PROGRESS NOTES
[facility]          OUTPATIENT PHYSICAL THERAPY ORTHOPEDIC EVALUATION  PLAN OF TREATMENT FOR OUTPATIENT REHABILITATION  (COMPLETE FOR INITIAL CLAIMS ONLY)  Patient's Last Name, First Name, M.I.  YOB: 1940  Herson Mondragon    Provider s Name:  [facility]   Medical Record No.  [id number]   Start of Care Date:  08/16/17   Onset Date:  08/01/17   Type:     _X__PT   ___OT   ___SLP Medical Diagnosis:  sciatica     PT Diagnosis:  R LE radicular sx, morelikely from the spine than an isolated muscle   Visits from SOC:  1      _________________________________________________________________________________  Plan of Treatment/Functional Goals:  strengthening, stretching, manual therapy           Goals     Goal Description: pt able to walk household distances without cane and pain no greater than 2/10 in 4wk  Target Date: 09/16/17       Goal Description: pt will be ree to return to yardwork in 4wk  Target Date: 09/16/17       Therapy Frequency:  1 time/week  Predicted Duration of Therapy Intervention:  4wk    Kris Hoenk, PT                 I CERTIFY THE NEED FOR THESE SERVICES FURNISHED UNDER        THIS PLAN OF TREATMENT AND WHILE UNDER MY CARE     (Physician co-signature of this document indicates review and certification of the therapy plan).                         Certification Date From:  08/16/17   Certification Date To:  09/16/17    Referring Provider:  Dai Doherty NP    Initial Assessment        See Epic Evaluation Start of Care Date: 08/16/17

## 2017-08-18 ENCOUNTER — MYC MEDICAL ADVICE (OUTPATIENT)
Dept: OTHER | Age: 77
End: 2017-08-18

## 2017-08-18 ENCOUNTER — MYC MEDICAL ADVICE (OUTPATIENT)
Dept: FAMILY MEDICINE | Facility: CLINIC | Age: 77
End: 2017-08-18

## 2017-08-18 NOTE — TELEPHONE ENCOUNTER
Please see patient MyChart message  Patient is stating that he signed and turned in form 8/16/17 after forms were mailed 8/14/17.  Patient reports he did not received forms in the mail, so he returned to clinic 8/16/17.  Now he is wanting to know where the handicap sticker/card is or where the forms he signed on 8/16/17 are at in process.    Nurse note 8/16/17:  Wife here, inquiring about the handicapped permit they have requested.   Herson was in the car, she took the form to him to fill out and sign the top portion and will bring it back, I will put it back in Ene's basket and advised wife we will notify her when this has been completed. Thanks,   Chantale Stoner RNC    Have you seen these or have these forms?  Please advise    Dixie RIVERO Rn

## 2017-08-19 NOTE — TELEPHONE ENCOUNTER
It appears there was some confusion as to when the form was completed, signed and mailed to patient.  It was mailed on the 17th (probably went out the 18).  I have sent patient a My chart note indicating this.

## 2017-08-21 ENCOUNTER — MYC MEDICAL ADVICE (OUTPATIENT)
Dept: FAMILY MEDICINE | Facility: CLINIC | Age: 77
End: 2017-08-21

## 2017-08-23 ENCOUNTER — HOSPITAL ENCOUNTER (OUTPATIENT)
Dept: PHYSICAL THERAPY | Facility: CLINIC | Age: 77
Setting detail: THERAPIES SERIES
End: 2017-08-23
Attending: NURSE PRACTITIONER
Payer: MEDICARE

## 2017-08-23 PROCEDURE — 40000718 ZZHC STATISTIC PT DEPARTMENT ORTHO VISIT: Performed by: PHYSICAL THERAPIST

## 2017-08-23 PROCEDURE — 97110 THERAPEUTIC EXERCISES: CPT | Mod: GP | Performed by: PHYSICAL THERAPIST

## 2017-08-23 PROCEDURE — G8979 MOBILITY GOAL STATUS: HCPCS | Mod: GP,CI | Performed by: PHYSICAL THERAPIST

## 2017-08-23 PROCEDURE — G8980 MOBILITY D/C STATUS: HCPCS | Mod: GP,CH | Performed by: PHYSICAL THERAPIST

## 2017-09-26 NOTE — PROGRESS NOTES
Herson Mondragon   PHYSICAL THERAPY DISCHARGE  08/23/17 1100   Signing Clinician's Name / Credentials   Signing clinician's name / credentials Kris Hoenk, PT   Session Number   Session Number 2    PT Medicare Only G-code   G-code Mobility: Walking & Moving Around   Mobility: Walking & Moving Around   Mobility Goal,  (eval/re-eval, every progress note, & discharge) CI: 1-19% impairment   Mobility Discharge Status,  (discharge) CH: 0% impairment   Discharge Mobility Modifier Rationale no pain, able to walk 3 hours shopping   Adult Goals   PT Ortho Eval Goals 1;2   Ortho Goal 1   Goal Description pt able to walk household distances without cane and pain no greater than 2/10 in 4wk   Target Date 09/16/17   Date Met 08/23/17   Ortho Goal 2   Goal Description pt will be ree to return to yardwork in 4wk   Target Date 09/16/17   Date Met 08/23/17   Subjective Report   Subjective Report occas has to push that area, can push it once and keep walking, exercises helping greatly, has not had to use walker or cane   Objective Measures   Objective Measures Objective Measure 1   Objective Measure 1   Details no tenderness   Therapeutic Procedure/exercise   Minutes 15   Skilled Intervention modified, added to HEP   Patient Response hip abd more tiring than clam   Treatment Detail add SL hip abd x20 - cues for alignment, SL clam x20 - cues to keep hips from rockin gback, bridge x20, ham stretch sitting - gave pic   Manual Therapy   Minutes 3   Skilled Intervention TFM dec pain, loosen tissue   Patient Response no tenderness   Treatment Detail TFM glut med tendon insertion   Plan   Plan for next session will hold open should sx return, otherwise, goals met, will DC to HEP if pt is not heard from in 30 days   Total Session Time   Timed Code Treatment Minutes 18   Total Treatment Time (sum of timed and untimed services) 18   Kris Hoenk, PT #[id number]  [facility]

## 2017-09-26 NOTE — ADDENDUM NOTE
Encounter addended by: Hoenk, Kris, PT on: 9/26/2017  3:09 PM<BR>     Actions taken: Sign clinical note, Flowsheet accepted, Episode resolved

## 2017-10-13 ENCOUNTER — OFFICE VISIT (OUTPATIENT)
Dept: FAMILY MEDICINE | Facility: CLINIC | Age: 77
End: 2017-10-13
Payer: COMMERCIAL

## 2017-10-13 VITALS
SYSTOLIC BLOOD PRESSURE: 182 MMHG | DIASTOLIC BLOOD PRESSURE: 99 MMHG | HEIGHT: 71 IN | WEIGHT: 209 LBS | BODY MASS INDEX: 29.26 KG/M2 | HEART RATE: 68 BPM

## 2017-10-13 DIAGNOSIS — M54.31 SCIATICA OF RIGHT SIDE: Primary | ICD-10-CM

## 2017-10-13 PROCEDURE — 99213 OFFICE O/P EST LOW 20 MIN: CPT | Performed by: FAMILY MEDICINE

## 2017-10-13 RX ORDER — KETOROLAC TROMETHAMINE 10 MG/1
10 TABLET, FILM COATED ORAL EVERY 6 HOURS PRN
Qty: 20 TABLET | Refills: 0 | Status: SHIPPED | OUTPATIENT
Start: 2017-10-13 | End: 2018-08-10

## 2017-10-13 NOTE — MR AVS SNAPSHOT
After Visit Summary   10/13/2017    Herson Mondragon    MRN: [id number]           Patient Information     Date Of Birth          1940        Visit Information        Provider Department      10/13/2017 3:00 PM Tiburcio Landers MD [facility]        Today's Diagnoses     Sciatica of right side    -  1      Care Instructions          Thank you for choosing [facility].  You may be receiving a survey in the mail from [facility] regarding your visit today.  Please take a few minutes to complete and return the survey to let us know how we are doing.      If you have questions or concerns, please contact us via [employer] or you can contact your care team at [phone].    Our Clinic hours are:  Monday 6:40 am  to 7:00 pm  Tuesday -Friday 6:40 am to 5:00 pm    The [place] outpatient lab hours are:  Monday - Friday 6:10 am to 4:45 pm  Saturdays 7:00 am to 11:00 am  Appointments are required, call [phone]    If you have clinical questions after hours or would like to schedule an appointment,  call the clinic at [phone].\    (M54.31) Sciatica of right side  (primary encounter diagnosis)  Comment:   Plan: MR Pelvis Soft Tissue w/o & w Contrast,         ketorolac (TORADOL) 10 MG tablet        We discussed the likely cause of the symptoms as pressure on the right sciatic nerve, at the sciatic notch. There is a bulge and possible muscle herniation at this area.   Modify activities and use the Toradol at 10 mg per dose, with food, as needed, every 6-8 hours. There are #20 pills and no refills. Take no advil or aleve or aspirin when on this. Tylenol is OK to take.   The MRI of the pelvis is ordered and go there now or call [phone]. We will call the results. If this does not resolve this then we will refer to our surgeons for evaluation of a possible hernia in the area pushing on the nerve. Call my RN at [phone] for the referral.           Follow-ups after your visit       "  Future tests that were ordered for you today     Open Future Orders        Priority Expected Expires Ordered    MR Pelvis Soft Tissue w/o & w Contrast Routine  10/13/2018 10/13/2017            Who to contact     If you have questions or need follow up information about today's clinic visit or your schedule please contact [facility] directly at [phone].  Normal or non-critical lab and imaging results will be communicated to you by TokBoxhart, letter or phone within 4 business days after the clinic has received the results. If you do not hear from us within 7 days, please contact the clinic through TokBoxhart or phone. If you have a critical or abnormal lab result, we will notify you by phone as soon as possible.  Submit refill requests through Upshot or call your pharmacy and they will forward the refill request to us. Please allow 3 business days for your refill to be completed.          Additional Information About Your Visit        TokBox[employer]ot[employer] Information     Upshot gives you secure access to your electronic health record. If you see a primary care provider, you can also send messages to your care team and make appointments. If you have questions, please call your primary care clinic.  If you do not have a primary care provider, please call [phone] and they will assist you.        Care EveryWhere ID     This is your Care EveryWhere ID. This could be used by other organizations to access your [place] medical records  [id number]        Your Vitals Were     Pulse Height BMI (Body Mass Index)             68 5' 10.5\" (1.791 m) 29.56 kg/m2          Blood Pressure from Last 3 Encounters:   10/13/17 (!) 182/99   08/14/17 (!) 164/95   07/24/17 154/86    Weight from Last 3 Encounters:   10/13/17 209 lb (94.8 kg)   08/14/17 209 lb 6.4 oz (95 kg)   07/24/17 211 lb (95.7 kg)                 Today's Medication Changes          These changes are accurate as of: 10/13/17  3:46 PM.  If you have any questions, " ask your nurse or doctor.               Start taking these medicines.        Dose/Directions    ketorolac 10 MG tablet   Commonly known as:  TORADOL   Used for:  Sciatica of right side   Started by:  Tiburcio Landers MD        Dose:  10 mg   Take 1 tablet (10 mg) by mouth every 6 hours as needed   Quantity:  20 tablet   Refills:  0            Where to get your medicines      These medications were sent to [facility] [place] - [place], [place] - [address]  [address] [address]     Phone:  [phone]     ketorolac 10 MG tablet                Primary Care Provider Office Phone # Fax #    Tiburcio Landers -[phone]       [address] [address]        Equal Access to Services     DEANDRE MCKEON : Hadii jagdish ruelas hadasho Somarcelali, waaxda luqadaha, qaybta kaalmada adeegyada, kristopher juan . So [facility] [phone].    ATENCIÓN: Si habla español, tiene a donis disposición servicios gratuitos de asistencia lingüística. [facility] [phone].    We comply with applicable federal civil rights laws and [place] laws. We do not discriminate on the basis of race, color, national origin, age, disability, sex, sexual orientation, or gender identity.            Thank you!     Thank you for choosing [facility]  for your care. Our goal is always to provide you with excellent care. Hearing back from our patients is one way we can continue to improve our services. Please take a few minutes to complete the written survey that you may receive in the mail after your visit with us. Thank you!             Your Updated Medication List - Protect others around you: Learn how to safely use, store and throw away your medicines at www.disposemymeds.org.          This list is accurate as of: 10/13/17  3:46 PM.  Always use your most recent med list.                   Brand Name Dispense Instructions for use Diagnosis    fish oil-omega-3 fatty acids  1000 MG capsule      Take 2 g by mouth every morning.        hydrochlorothiazide 50 MG tablet    HYDRODIURIL    90 tablet    Take 1 tablet (50 mg) by mouth daily    Essential hypertension       ketorolac 10 MG tablet    TORADOL    20 tablet    Take 1 tablet (10 mg) by mouth every 6 hours as needed    Sciatica of right side       lisinopril 10 MG tablet    PRINIVIL/ZESTRIL    90 tablet    Take 1 tablet (10 mg) by mouth daily    Essential hypertension       LORazepam 0.5 MG tablet    ATIVAN    20 tablet    Take 1 tablet (0.5 mg) by mouth every 6 hours as needed for anxiety (For flying.)    Generalized anxiety disorder       ONE DAILY MENS Tabs      Take 1 tablet by mouth daily.        PARoxetine 10 MG tablet    PAXIL    90 tablet    Take 1 tablet (10 mg) by mouth At Bedtime    Generalized anxiety disorder       simvastatin 10 MG tablet    ZOCOR    90 tablet    TAKE ONE TABLET BY MOUTH AT BEDTIME    Hyperlipidemia LDL goal <100

## 2017-10-13 NOTE — PATIENT INSTRUCTIONS
Thank you for choosing [facility].  You may be receiving a survey in the mail from [facility] regarding your visit today.  Please take a few minutes to complete and return the survey to let us know how we are doing.      If you have questions or concerns, please contact us via [employer] or you can contact your care team at [phone].    Our Clinic hours are:  Monday 6:40 am  to 7:00 pm  Tuesday -Friday 6:40 am to 5:00 pm    The [place] outpatient lab hours are:  Monday - Friday 6:10 am to 4:45 pm  Saturdays 7:00 am to 11:00 am  Appointments are required, call [phone]    If you have clinical questions after hours or would like to schedule an appointment,  call the clinic at [phone].\    (M54.31) Sciatica of right side  (primary encounter diagnosis)  Comment:   Plan: MR Pelvis Soft Tissue w/o & w Contrast,         ketorolac (TORADOL) 10 MG tablet        We discussed the likely cause of the symptoms as pressure on the right sciatic nerve, at the sciatic notch. There is a bulge and possible muscle herniation at this area.   Modify activities and use the Toradol at 10 mg per dose, with food, as needed, every 6-8 hours. There are #20 pills and no refills. Take no advil or aleve or aspirin when on this. Tylenol is OK to take.   The MRI of the pelvis is ordered and go there now or call [phone]. We will call the results. If this does not resolve this then we will refer to our surgeons for evaluation of a possible hernia in the area pushing on the nerve. Call my RN at [phone] for the referral.

## 2017-10-13 NOTE — PROGRESS NOTES
"  SUBJECTIVE:   Herson Mondragon is a 77 year old male who presents to clinic today for the following health issues:      Joint Pain    Onset: intermittent for several months, severe more recently    Description:   Location: right knee and right hip  Character: Sharp    Intensity: severe, 10/10    Progression of Symptoms: worse    Accompanying Signs & Symptoms:  Other symptoms: radiation of pain to right leg into his ankle and calf    History:   Previous similar pain: YES- he had a bout of this about 6 months ago      Precipitating factors:   Trauma or overuse: no     Alleviating factors:  Improved by: nothing    Therapies Tried and outcome: n/a      Current Outpatient Prescriptions:      simvastatin (ZOCOR) 10 MG tablet, TAKE ONE TABLET BY MOUTH AT BEDTIME, Disp: 90 tablet, Rfl: 3     hydrochlorothiazide (HYDRODIURIL) 50 MG tablet, Take 1 tablet (50 mg) by mouth daily, Disp: 90 tablet, Rfl: 3     PARoxetine (PAXIL) 10 MG tablet, Take 1 tablet (10 mg) by mouth At Bedtime, Disp: 90 tablet, Rfl: 3     lisinopril (PRINIVIL/ZESTRIL) 10 MG tablet, Take 1 tablet (10 mg) by mouth daily, Disp: 90 tablet, Rfl: 3     LORazepam (ATIVAN) 0.5 MG tablet, Take 1 tablet (0.5 mg) by mouth every 6 hours as needed for anxiety (For flying.), Disp: 20 tablet, Rfl: 1     Multiple Vitamins-Minerals (ONE DAILY MENS) TABS, Take 1 tablet by mouth daily., Disp: , Rfl:      fish oil-omega-3 fatty acids (FISH OIL) 1000 MG capsule, Take 2 g by mouth every morning., Disp: , Rfl:     Patient Active Problem List   Diagnosis     Essential hypertension     Syncope and collapse     Atrial fibrillation (H)     Hyperlipidemia LDL goal <130     Generalized anxiety disorder     Shoulder arthritis     Sinus bradycardia       Blood pressure (!) 182/99, pulse 68, height 5' 10.5\" (1.791 m), weight 209 lb (94.8 kg).    Exam:  GENERAL APPEARANCE: healthy, alert and no distress  MS: tender to palpation on the right sciatic notch; there is a slight bulge there " compared to the left side.   SKIN: no suspicious lesions or rashes  PSYCH: mentation appears normal and affect normal/bright      (M54.31) Sciatica of right side  (primary encounter diagnosis)  Comment:   Plan: MR Pelvis Soft Tissue w/o & w Contrast,         ketorolac (TORADOL) 10 MG tablet        We discussed the likely cause of the symptoms as pressure on the right sciatic nerve, at the sciatic notch. There is a bulge and possible muscle herniation at this area.   Modify activities and use the Toradol at 10 mg per dose, with food, as needed, every 6-8 hours. There are #20 pills and no refills. Take no advil or aleve or aspirin when on this. Tylenol is OK to take.   The MRI of the pelvis is ordered and go there now or call [phone]. We will call the results. If this does not resolve this then we will refer to our surgeons for evaluation of a possible hernia in the area pushing on the nerve. Call my RN at [phone] for the referral.     Tiburcio Landers

## 2017-10-16 ENCOUNTER — HOSPITAL ENCOUNTER (OUTPATIENT)
Dept: MRI IMAGING | Facility: CLINIC | Age: 77
Discharge: HOME | End: 2017-10-16
Attending: FAMILY MEDICINE | Admitting: FAMILY MEDICINE
Payer: MEDICARE

## 2017-10-16 DIAGNOSIS — M54.31 SCIATICA OF RIGHT SIDE: ICD-10-CM

## 2017-10-16 PROCEDURE — 72195 MRI PELVIS W/O DYE: CPT

## 2017-10-17 ENCOUNTER — MYC MEDICAL ADVICE (OUTPATIENT)
Dept: FAMILY MEDICINE | Facility: CLINIC | Age: 77
End: 2017-10-17

## 2017-10-17 DIAGNOSIS — M54.31 SCIATICA OF RIGHT SIDE: ICD-10-CM

## 2017-10-18 RX ORDER — KETOROLAC TROMETHAMINE 10 MG/1
10 TABLET, FILM COATED ORAL EVERY 6 HOURS PRN
Qty: 20 TABLET | Refills: 0 | Status: CANCELLED | OUTPATIENT
Start: 2017-10-18

## 2017-10-18 NOTE — TELEPHONE ENCOUNTER
Toradol cannot be refilled I explained to him this is a one time Rx. It is too strong to refill.   It is an anti-inflammatory and he may use advil or aleve with Tylenol.   Tiburcio Landers

## 2017-10-18 NOTE — TELEPHONE ENCOUNTER
Dr Landers, please see his mychart note/ request for pain med, and explanation about reasons he is unable to be seen.   Chantale Stoner RNC

## 2018-07-17 DIAGNOSIS — E78.5 HYPERLIPIDEMIA LDL GOAL <100: ICD-10-CM

## 2018-07-17 NOTE — TELEPHONE ENCOUNTER
Requested Prescriptions   Pending Prescriptions Disp Refills     simvastatin (ZOCOR) 10 MG tablet 90 tablet 3     Sig: TAKE ONE TABLET BY MOUTH AT BEDTIME    There is no refill protocol information for this order        Last Written Prescription Date:  7/24/17  Last Fill Quantity: 90,  # refills: 3   Last office visit: 10/13/2017 with prescribing provider:  Tosteson   Future Office Visit:

## 2018-07-18 RX ORDER — SIMVASTATIN 10 MG
TABLET ORAL
Qty: 90 TABLET | Refills: 3 | Status: SHIPPED | OUTPATIENT
Start: 2018-07-18 | End: 2019-07-18

## 2018-07-24 DIAGNOSIS — I10 ESSENTIAL HYPERTENSION: ICD-10-CM

## 2018-07-24 DIAGNOSIS — E78.5 HYPERLIPIDEMIA LDL GOAL <100: ICD-10-CM

## 2018-07-24 LAB
ALT SERPL W P-5'-P-CCNC: 21 U/L (ref 0–70)
CHOLEST SERPL-MCNC: 191 MG/DL
CREAT SERPL-MCNC: 1.49 MG/DL (ref 0.66–1.25)
GFR SERPL CREATININE-BSD FRML MDRD: 45 ML/MIN/1.7M2
GLUCOSE SERPL-MCNC: 92 MG/DL (ref 70–99)
HDLC SERPL-MCNC: 41 MG/DL
LDLC SERPL CALC-MCNC: 114 MG/DL
NONHDLC SERPL-MCNC: 150 MG/DL
POTASSIUM SERPL-SCNC: 3.9 MMOL/L (ref 3.4–5.3)
TRIGL SERPL-MCNC: 180 MG/DL

## 2018-07-24 PROCEDURE — 84132 ASSAY OF SERUM POTASSIUM: CPT | Performed by: FAMILY MEDICINE

## 2018-07-24 PROCEDURE — 82565 ASSAY OF CREATININE: CPT | Performed by: FAMILY MEDICINE

## 2018-07-24 PROCEDURE — 36415 COLL VENOUS BLD VENIPUNCTURE: CPT | Performed by: FAMILY MEDICINE

## 2018-07-24 PROCEDURE — 84460 ALANINE AMINO (ALT) (SGPT): CPT | Performed by: FAMILY MEDICINE

## 2018-07-24 PROCEDURE — 80061 LIPID PANEL: CPT | Performed by: FAMILY MEDICINE

## 2018-07-24 PROCEDURE — 82947 ASSAY GLUCOSE BLOOD QUANT: CPT | Performed by: FAMILY MEDICINE

## 2018-08-10 ENCOUNTER — OFFICE VISIT (OUTPATIENT)
Dept: FAMILY MEDICINE | Facility: CLINIC | Age: 78
End: 2018-08-10
Payer: COMMERCIAL

## 2018-08-10 VITALS
DIASTOLIC BLOOD PRESSURE: 74 MMHG | BODY MASS INDEX: 29.92 KG/M2 | RESPIRATION RATE: 16 BRPM | TEMPERATURE: 98.6 F | WEIGHT: 209 LBS | SYSTOLIC BLOOD PRESSURE: 152 MMHG | HEART RATE: 68 BPM | HEIGHT: 70 IN

## 2018-08-10 DIAGNOSIS — M54.31 SCIATICA OF RIGHT SIDE: Primary | ICD-10-CM

## 2018-08-10 DIAGNOSIS — F41.1 GENERALIZED ANXIETY DISORDER: ICD-10-CM

## 2018-08-10 DIAGNOSIS — I10 ESSENTIAL HYPERTENSION: ICD-10-CM

## 2018-08-10 LAB
ALBUMIN UR-MCNC: NEGATIVE MG/DL
APPEARANCE UR: CLEAR
BILIRUB UR QL STRIP: NEGATIVE
COLOR UR AUTO: YELLOW
GLUCOSE UR STRIP-MCNC: NEGATIVE MG/DL
HGB UR QL STRIP: NEGATIVE
KETONES UR STRIP-MCNC: NEGATIVE MG/DL
LEUKOCYTE ESTERASE UR QL STRIP: NEGATIVE
NITRATE UR QL: NEGATIVE
PH UR STRIP: 5.5 PH (ref 5–7)
SOURCE: NORMAL
SP GR UR STRIP: 1.02 (ref 1–1.03)
UROBILINOGEN UR STRIP-ACNC: 0.2 EU/DL (ref 0.2–1)

## 2018-08-10 PROCEDURE — 81003 URINALYSIS AUTO W/O SCOPE: CPT | Performed by: FAMILY MEDICINE

## 2018-08-10 PROCEDURE — 99214 OFFICE O/P EST MOD 30 MIN: CPT | Performed by: FAMILY MEDICINE

## 2018-08-10 RX ORDER — PAROXETINE 10 MG/1
10 TABLET, FILM COATED ORAL AT BEDTIME
Qty: 90 TABLET | Refills: 3 | Status: SHIPPED | OUTPATIENT
Start: 2018-08-10 | End: 2019-08-09

## 2018-08-10 RX ORDER — LISINOPRIL 10 MG/1
10 TABLET ORAL DAILY
Qty: 90 TABLET | Refills: 3 | Status: SHIPPED | OUTPATIENT
Start: 2018-08-10 | End: 2019-08-09

## 2018-08-10 RX ORDER — LORAZEPAM 0.5 MG/1
0.5 TABLET ORAL EVERY 6 HOURS PRN
Qty: 20 TABLET | Refills: 1 | Status: SHIPPED | OUTPATIENT
Start: 2018-08-10 | End: 2019-08-09

## 2018-08-10 RX ORDER — HYDROCHLOROTHIAZIDE 25 MG/1
25 TABLET ORAL DAILY
Qty: 90 TABLET | Refills: 3 | Status: SHIPPED | OUTPATIENT
Start: 2018-08-10 | End: 2019-07-18

## 2018-08-10 RX ORDER — KETOROLAC TROMETHAMINE 10 MG/1
10 TABLET, FILM COATED ORAL EVERY 6 HOURS PRN
Qty: 20 TABLET | Refills: 0 | Status: SHIPPED | OUTPATIENT
Start: 2018-08-10 | End: 2019-08-09

## 2018-08-10 NOTE — MR AVS SNAPSHOT
After Visit Summary   8/10/2018    Herson Mondragon    MRN: [id number]           Patient Information     Date Of Birth          1940        Visit Information        Provider Department      8/10/2018 2:40 PM Tiburcio Landers MD [facility]        Today's Diagnoses     Left-sided thoracic back pain    -  1    Sciatica of right side        Essential hypertension        Generalized anxiety disorder          Care Instructions    (M54.6) Left-sided thoracic back pain  (primary encounter diagnosis)  Comment:   Plan: *UA reflex to Microscopic and Culture ([place]         and [facility] (except [place] and         [place])        Instructions given on diagnoses and recheck as needed if the UA is normal.     (M54.31) Sciatica of right side  Comment:   Plan: ketorolac (TORADOL) 10 MG tablet        We discussed the modification of activities and avoid repetitive bending and twisting and lifting.   Use ice on the middle of the spine and heat if there are tight muscles. Gradually increase the activities.   Physical therapy could be ordered for the left L3-4 nerve.     (I10) Essential hypertension  Comment:   Plan: hydrochlorothiazide (HYDRODIURIL) 25 MG tablet,        lisinopril (PRINIVIL/ZESTRIL) 10 MG tablet        Monitor and record the BP at rest and the goal for the average is under 130/80.   Use the non drug therapies. If doing well then refill and recheck annually.     (F41.1) Generalized anxiety disorder  Comment:   Plan: PARoxetine (PAXIL) 10 MG tablet, LORazepam         (ATIVAN) 0.5 MG tablet        Use this as discussed. Avoid stimulants.           Follow-ups after your visit        Who to contact     If you have questions or need follow up information about today's clinic visit or your schedule please contact [facility] directly at [phone].  Normal or non-critical lab and imaging results will be communicated to you by MyChart, letter or phone within 4  "business days after the clinic has received the results. If you do not hear from us within 7 days, please contact the clinic through [employer] or phone. If you have a critical or abnormal lab result, we will notify you by phone as soon as possible.  Submit refill requests through [employer] or call your pharmacy and they will forward the refill request to us. Please allow 3 business days for your refill to be completed.          Additional Information About Your Visit        [employer] Information     [employer] gives you secure access to your electronic health record. If you see a primary care provider, you can also send messages to your care team and make appointments. If you have questions, please call your primary care clinic.  If you do not have a primary care provider, please call [phone] and they will assist you.        Care EveryWhere ID     This is your Care EveryWhere ID. This could be used by other organizations to access your [place] medical records  [id number]        Your Vitals Were     Pulse Temperature Respirations Height BMI (Body Mass Index)       68 98.6  F (37  C) (Tympanic) 16 5' 9.75\" (1.772 m) 30.2 kg/m2        Blood Pressure from Last 3 Encounters:   08/10/18 152/74   10/13/17 (!) 182/99   08/14/17 (!) 164/95    Weight from Last 3 Encounters:   08/10/18 209 lb (94.8 kg)   10/13/17 209 lb (94.8 kg)   08/14/17 209 lb 6.4 oz (95 kg)              We Performed the Following     *UA reflex to Microscopic and Culture ([place] [facility] (except [place] and [place])          Today's Medication Changes          These changes are accurate as of 8/10/18  3:18 PM.  If you have any questions, ask your nurse or doctor.               These medicines have changed or have updated prescriptions.        Dose/Directions    hydrochlorothiazide 25 MG tablet   Commonly known as:  HYDRODIURIL   This may have changed:    - medication strength  - how much to take   Used for:  Essential hypertension   Changed " by:  Tiburcio Landers MD        Dose:  25 mg   Take 1 tablet (25 mg) by mouth daily   Quantity:  90 tablet   Refills:  3            Where to get your medicines      These medications were sent to [id number] - [place], [place] - [address]  [address] [address]     Phone:  [phone]     hydrochlorothiazide 25 MG tablet    ketorolac 10 MG tablet    lisinopril 10 MG tablet    PARoxetine 10 MG tablet         Some of these will need a paper prescription and others can be bought over the counter.  Ask your nurse if you have questions.     Bring a paper prescription for each of these medications     LORazepam 0.5 MG tablet                Primary Care Provider Office Phone # Fax #    Tiburcio Landers [phone] [address] [facility] [address]        Equal Access to Services     DEANDRE MCKEON : Hadii jagdish ruelas hadasho Soomaali, waaxda luqadaha, qaybta kaalmada adeegyada, kristopher juan . So [facility] [phone].    ATENCIÓN: Si habla español, tiene a donis disposición servicios gratuitos de asistencia lingüística. Llame al [phone].    We comply with applicable federal civil rights laws and [place] laws. We do not discriminate on the basis of race, color, national origin, age, disability, sex, sexual orientation, or gender identity.            Thank you!     Thank you for choosing [facility]  for your care. Our goal is always to provide you with excellent care. Hearing back from our patients is one way we can continue to improve our services. Please take a few minutes to complete the written survey that you may receive in the mail after your visit with us. Thank you!             Your Updated Medication List - Protect others around you: Learn how to safely use, store and throw away your medicines at www.disposemymeds.org.          This list is accurate as of 8/10/18  3:18 PM.  Always use your most recent med list.                    Brand Name Dispense Instructions for use Diagnosis    fish oil-omega-3 fatty acids 1000 MG capsule      Take 2 g by mouth every morning.        hydrochlorothiazide 25 MG tablet    HYDRODIURIL    90 tablet    Take 1 tablet (25 mg) by mouth daily    Essential hypertension       ketorolac 10 MG tablet    TORADOL    20 tablet    Take 1 tablet (10 mg) by mouth every 6 hours as needed    Sciatica of right side       lisinopril 10 MG tablet    PRINIVIL/ZESTRIL    90 tablet    Take 1 tablet (10 mg) by mouth daily    Essential hypertension       LORazepam 0.5 MG tablet    ATIVAN    20 tablet    Take 1 tablet (0.5 mg) by mouth every 6 hours as needed for anxiety (For flying.)    Generalized anxiety disorder       ONE DAILY MENS Tabs      Take 1 tablet by mouth daily.        PARoxetine 10 MG tablet    PAXIL    90 tablet    Take 1 tablet (10 mg) by mouth At Bedtime    Generalized anxiety disorder       simvastatin 10 MG tablet    ZOCOR    90 tablet    TAKE ONE TABLET BY MOUTH AT BEDTIME (Needs follow-up appointment for this medication)    Hyperlipidemia LDL goal <100

## 2018-08-10 NOTE — PROGRESS NOTES
SUBJECTIVE:   Herson Mondragno is a 78 year old male who presents to clinic today for the following health issues:    Hypertension Follow-up      Outpatient blood pressures are being checked at home.  Results are 118/75.    Low Salt Diet: no added salt      Amount of exercise or physical activity: None. Patient states he is outside doing a lot of yard work but no exercises.    Problems taking medications regularly: No    Medication side effects: none    Diet: regular (no restrictions)      Back Pain       Duration: x 1 month        Specific cause: none    Description:   Location of pain: low back left and middle of back left. Patient states he has also been urinating a lot.  Character of pain: sharp and intermittent. Patient states if he takes it easy the pain is bearable. If he does yard work it gets worse.  Pain radiation:radiates into the left leg  New numbness or weakness in legs, not attributed to pain:  no     Intensity: Currently 0/10    History:   Pain interferes with job: Not applicable  History of back problems: Sciatic issues  Any previous MRI or X-rays: None  Sees a specialist for back pain:  No  Therapies tried without relief: acetaminophen (Tylenol)- helps for a little bit.    Alleviating factors:   Improved by: Toradol and acetaminophen (Tylenol)      Precipitating factors:  Worsened by: Lifting, Bending, Standing and just being active.        Current Outpatient Prescriptions:      fish oil-omega-3 fatty acids (FISH OIL) 1000 MG capsule, Take 2 g by mouth every morning., Disp: , Rfl:      hydrochlorothiazide (HYDRODIURIL) 50 MG tablet, Take 1 tablet (50 mg) by mouth daily (Patient taking differently: Take 50 mg by mouth daily Patient states he takes 25mg of the Hydrochlorothiazide. He cuts it in half.), Disp: 90 tablet, Rfl: 3     lisinopril (PRINIVIL/ZESTRIL) 10 MG tablet, Take 1 tablet (10 mg) by mouth daily, Disp: 90 tablet, Rfl: 3     Multiple Vitamins-Minerals (ONE DAILY MENS) TABS, Take 1  "tablet by mouth daily., Disp: , Rfl:      PARoxetine (PAXIL) 10 MG tablet, Take 1 tablet (10 mg) by mouth At Bedtime, Disp: 90 tablet, Rfl: 3     simvastatin (ZOCOR) 10 MG tablet, TAKE ONE TABLET BY MOUTH AT BEDTIME (Needs follow-up appointment for this medication), Disp: 90 tablet, Rfl: 3     ketorolac (TORADOL) 10 MG tablet, Take 1 tablet (10 mg) by mouth every 6 hours as needed (Patient not taking: Reported on 8/10/2018), Disp: 20 tablet, Rfl: 0     LORazepam (ATIVAN) 0.5 MG tablet, Take 1 tablet (0.5 mg) by mouth every 6 hours as needed for anxiety (For flying.) (Patient not taking: Reported on 8/10/2018), Disp: 20 tablet, Rfl: 1    Patient Active Problem List   Diagnosis     Essential hypertension     Syncope and collapse     Atrial fibrillation (H)     Hyperlipidemia LDL goal <130     Generalized anxiety disorder     Shoulder arthritis     Sinus bradycardia       Blood pressure 152/74, pulse 68, temperature 98.6  F (37  C), temperature source Tympanic, resp. rate 16, height 5' 9.75\" (1.772 m), weight 209 lb (94.8 kg).    Exam:  GENERAL APPEARANCE: healthy, alert and no distress  EYES: EOMI,  PERRL  NECK: no adenopathy, no asymmetry, masses, or scars and thyroid normal to palpation  RESP: lungs clear to auscultation - no rales, rhonchi or wheezes  CV: regular rates and rhythm, normal S1 S2, no S3 or S4 and no murmur, click or rub -  MS: extremities normal- no gross deformities noted, no evidence of inflammation in joints, FROM in all extremities.  SKIN: no suspicious lesions or rashes  PSYCH: mentation appears normal and affect normal/bright      (M54.6) Left-sided thoracic back pain  (primary encounter diagnosis)  Comment:   Plan: *UA reflex to Microscopic and Culture ([place]         and [facility] (except [place] and         [place])        Instructions given on diagnoses and recheck as needed if the UA is normal.     (M54.31) Sciatica of right side  Comment:   Plan: ketorolac (TORADOL) 10 MG " tablet        We discussed the modification of activities and avoid repetitive bending and twisting and lifting.   Use ice on the middle of the spine and heat if there are tight muscles. Gradually increase the activities.   Physical therapy could be ordered for the left L3-4 nerve.     (I10) Essential hypertension  Comment:   Plan: hydrochlorothiazide (HYDRODIURIL) 25 MG tablet,        lisinopril (PRINIVIL/ZESTRIL) 10 MG tablet        Monitor and record the BP at rest and the goal for the average is under 130/80.   Use the non drug therapies. If doing well then refill and recheck annually.     (F41.1) Generalized anxiety disorder  Comment:   Plan: PARoxetine (PAXIL) 10 MG tablet, LORazepam         (ATIVAN) 0.5 MG tablet        Use this as discussed. Avoid stimulants.     Tiburcio Landers

## 2018-08-10 NOTE — PATIENT INSTRUCTIONS
(M54.6) Left-sided thoracic back pain  (primary encounter diagnosis)  Comment:   Plan: *UA reflex to Microscopic and Culture ([facility]         and [facility] (except [place] and         [place])        Instructions given on diagnoses and recheck as needed if the UA is normal.     (M54.31) Sciatica of right side  Comment:   Plan: ketorolac (TORADOL) 10 MG tablet        We discussed the modification of activities and avoid repetitive bending and twisting and lifting.   Use ice on the middle of the spine and heat if there are tight muscles. Gradually increase the activities.   Physical therapy could be ordered for the left L3-4 nerve.     (I10) Essential hypertension  Comment:   Plan: hydrochlorothiazide (HYDRODIURIL) 25 MG tablet,        lisinopril (PRINIVIL/ZESTRIL) 10 MG tablet        Monitor and record the BP at rest and the goal for the average is under 130/80.   Use the non drug therapies. If doing well then refill and recheck annually.     (F41.1) Generalized anxiety disorder  Comment:   Plan: PARoxetine (PAXIL) 10 MG tablet, LORazepam         (ATIVAN) 0.5 MG tablet        Use this as discussed. Avoid stimulants.

## 2019-06-26 ENCOUNTER — TELEPHONE (OUTPATIENT)
Dept: FAMILY MEDICINE | Facility: CLINIC | Age: 79
End: 2019-06-26

## 2019-06-26 DIAGNOSIS — E78.5 HYPERLIPIDEMIA LDL GOAL <130: ICD-10-CM

## 2019-06-26 DIAGNOSIS — I10 ESSENTIAL HYPERTENSION: Primary | ICD-10-CM

## 2019-06-26 NOTE — TELEPHONE ENCOUNTER
No future appt as of yet.  Due for labs late July.  BMP and lipids ready.  Any others?    Routing to provider.  Teresa RIVERO RN

## 2019-06-26 NOTE — TELEPHONE ENCOUNTER
Reason for Call: Request for an order or referral: labs    Order or referral being requested: Labs for before apt.    Date needed: before my next appointment    Has the patient been seen by the PCP for this problem? NO      Phone number Patient can be reached at:  Home number on file [phone] (home)    Best Time:  any    Can we leave a detailed message on this number?  YES    Call taken on 6/26/2019 at 2:54 PM by Tigist Ibarra

## 2019-07-18 DIAGNOSIS — E78.5 HYPERLIPIDEMIA LDL GOAL <100: ICD-10-CM

## 2019-07-18 DIAGNOSIS — I10 ESSENTIAL HYPERTENSION: ICD-10-CM

## 2019-07-19 RX ORDER — HYDROCHLOROTHIAZIDE 25 MG/1
TABLET ORAL
Qty: 30 TABLET | Refills: 0 | Status: SHIPPED | OUTPATIENT
Start: 2019-07-19 | End: 2019-08-09

## 2019-07-19 RX ORDER — SIMVASTATIN 10 MG
TABLET ORAL
Qty: 30 TABLET | Refills: 0 | Status: SHIPPED | OUTPATIENT
Start: 2019-07-19 | End: 2019-08-09

## 2019-07-19 NOTE — TELEPHONE ENCOUNTER
"Requested Prescriptions   Pending Prescriptions Disp Refills     simvastatin (ZOCOR) 10 MG tablet [Pharmacy Med Name: SIMVASTATIN 10MG    TAB] 90 tablet 3     Sig: TAKE 1 TABLET BY MOUTH AT BEDTIME . APPOINTMENT REQUIRED FOR FUTURE REFILLS       Statins Protocol Passed - 7/18/2019  8:15 PM        Passed - LDL on file in past 12 months     Recent Labs   Lab Test 07/24/18  1030   *             Passed - No abnormal creatine kinase in past 12 months     No lab results found.             Passed - Recent (12 mo) or future (30 days) visit within the authorizing provider's specialty     Patient had office visit in the last 12 months or has a visit in the next 30 days with authorizing provider or within the authorizing provider's specialty.  See \"Patient Info\" tab in inbasket, or \"Choose Columns\" in Meds & Orders section of the refill encounter.              Passed - Medication is active on med list        Passed - Patient is age 18 or older   Last Written Prescription Date:  7/18/18  Last Fill Quantity: 90,  # refills: 3   Last office visit: 8/10/2018 with prescribing provider:  Anshul   Future Office Visit:         hydrochlorothiazide (HYDRODIURIL) 25 MG tablet [Pharmacy Med Name: HYDROCHLOROT 25MG   TAB] 90 tablet 3     Sig: TAKE 1 TABLET BY MOUTH ONCE DAILY       Diuretics (Including Combos) Protocol Failed - 7/18/2019  8:15 PM        Failed - Blood pressure under 140/90 in past 12 months     BP Readings from Last 3 Encounters:   08/10/18 152/74   10/13/17 (!) 182/99   08/14/17 (!) 164/95                 Failed - Normal serum creatinine on file in past 12 months     Recent Labs   Lab Test 07/24/18  1030   CR 1.49*              Failed - Normal serum sodium on file in past 12 months     Recent Labs   Lab Test 06/05/11  0940                 Passed - Recent (12 mo) or future (30 days) visit within the authorizing provider's specialty     Patient had office visit in the last 12 months or has a visit in the next " "30 days with authorizing provider or within the authorizing provider's specialty.  See \"Patient Info\" tab in inbasket, or \"Choose Columns\" in Meds & Orders section of the refill encounter.              Passed - Medication is active on med list        Passed - Patient is age 18 or older        Passed - Normal serum potassium on file in past 12 months     Recent Labs   Lab Test 07/24/18  1030   POTASSIUM 3.9                    Last Written Prescription Date:  8/10/18  Last Fill Quantity: 90,  # refills: 3   Last office visit: 8/10/2018 with prescribing provider:  Anshul   Future Office Visit:      "

## 2019-07-19 NOTE — TELEPHONE ENCOUNTER
Medication is being filled for 1 time refill only due to:  Patient needs to be seen because he is due next month for OV.   Letter sent.  Teresa RIVERO RN

## 2019-07-23 ENCOUNTER — NURSE TRIAGE (OUTPATIENT)
Dept: FAMILY MEDICINE | Facility: CLINIC | Age: 79
End: 2019-07-23

## 2019-07-23 ENCOUNTER — TELEPHONE (OUTPATIENT)
Dept: FAMILY MEDICINE | Facility: CLINIC | Age: 79
End: 2019-07-23

## 2019-07-23 ENCOUNTER — OFFICE VISIT (OUTPATIENT)
Dept: FAMILY MEDICINE | Facility: CLINIC | Age: 79
End: 2019-07-23
Payer: COMMERCIAL

## 2019-07-23 DIAGNOSIS — I10 ESSENTIAL HYPERTENSION: ICD-10-CM

## 2019-07-23 DIAGNOSIS — E78.5 HYPERLIPIDEMIA LDL GOAL <130: ICD-10-CM

## 2019-07-23 DIAGNOSIS — Z53.9 ERRONEOUS ENCOUNTER--DISREGARD: Primary | ICD-10-CM

## 2019-07-23 PROBLEM — N18.30 CKD (CHRONIC KIDNEY DISEASE) STAGE 3, GFR 30-59 ML/MIN (H): Status: ACTIVE | Noted: 2019-07-23

## 2019-07-23 LAB
ANION GAP SERPL CALCULATED.3IONS-SCNC: 5 MMOL/L (ref 3–14)
BUN SERPL-MCNC: 26 MG/DL (ref 7–30)
CALCIUM SERPL-MCNC: 9.5 MG/DL (ref 8.5–10.1)
CHLORIDE SERPL-SCNC: 103 MMOL/L (ref 94–109)
CHOLEST SERPL-MCNC: 126 MG/DL
CO2 SERPL-SCNC: 29 MMOL/L (ref 20–32)
CREAT SERPL-MCNC: 1.66 MG/DL (ref 0.66–1.25)
GFR SERPL CREATININE-BSD FRML MDRD: 38 ML/MIN/{1.73_M2}
GLUCOSE SERPL-MCNC: 97 MG/DL (ref 70–99)
HDLC SERPL-MCNC: 45 MG/DL
LDLC SERPL CALC-MCNC: 68 MG/DL
NONHDLC SERPL-MCNC: 81 MG/DL
POTASSIUM SERPL-SCNC: 3.8 MMOL/L (ref 3.4–5.3)
SODIUM SERPL-SCNC: 137 MMOL/L (ref 133–144)
TRIGL SERPL-MCNC: 64 MG/DL

## 2019-07-23 PROCEDURE — 80048 BASIC METABOLIC PNL TOTAL CA: CPT | Performed by: FAMILY MEDICINE

## 2019-07-23 PROCEDURE — 80061 LIPID PANEL: CPT | Performed by: FAMILY MEDICINE

## 2019-07-23 PROCEDURE — 36415 COLL VENOUS BLD VENIPUNCTURE: CPT | Performed by: FAMILY MEDICINE

## 2019-07-23 NOTE — TELEPHONE ENCOUNTER
Reason for Disposition    MODERATE difficulty breathing (e.g., speaks in phrases, SOB even at rest, pulse 100-120) of new onset or worse than normal    Difficulty breathing    Additional Information    Negative: Breathing stopped and hasn't returned    Negative: Choking on something    Negative: SEVERE difficulty breathing (e.g., struggling for each breath, speaks in single words, pulse > 120)    Negative: Bluish (or gray) lips or face    Negative: Difficult to awaken or acting confused (e.g., disoriented, slurred speech)    Negative: Passed out (i.e., fainted, collapsed and was not responding)    Negative: Wheezing started suddenly after medicine, an allergic food, or bee sting    Negative: Stridor    Negative: Slow, shallow and weak breathing    Negative: Sounds like a life-threatening emergency to the triager    Negative: Chest pain    Negative: Wheezing (high pitched whistling sound) and previous asthma attacks or use of asthma medicines    Negative: Difficulty breathing and only present when coughing    Negative: Difficulty breathing and only from stuffy or runny nose    Negative: Followed an injury to chest    Negative: Severe difficulty breathing (e.g., struggling for each breath, speaks in single words)    Negative: Passed out (i.e., fainted, collapsed and was not responding)    Negative: Chest pain lasting longer than 5 minutes and ANY of the following:* Over 50 years old* Over 30 years old and at least one cardiac risk factor (i.e., high blood pressure, diabetes, high cholesterol, obesity, smoker or strong family history of heart disease)* Pain is crushing, pressure-like, or heavy * Took nitroglycerin and chest pain was not relieved* History of heart disease (i.e., angina, heart attack, bypass surgery, angioplasty, CHF)    Negative: Visible sweat on face or sweat dripping down face    Negative: Sounds like a life-threatening emergency to the triager    Answer Assessment - Initial Assessment Questions  1.  "RESPIRATORY STATUS: \"Describe your breathing?\" (e.g., wheezing, shortness of breath, unable to speak, severe coughing)       \"very short of breath\"  Cannot walk to the garage without stopping to rest and catch his breath.  Sleeping sitting up; complains he can't breathe when lays down.  Wheezing.  Coughing.  Feet are very swollen too.  Wife says that all pt wants to do is sleep this past week.  Sleeps 90% of the time which is unusual for him.    2. ONSET: \"When did this breathing problem begin?\"       Started 4-5 months ago and progressively getting worse.  Over the last week has gotten much worse.    3. PATTERN \"Does the difficult breathing come and go, or has it been constant since it started?\"       Constant over past week.    4. SEVERITY: \"How bad is your breathing?\" (e.g., mild, moderate, severe)     - MILD: No SOB at rest, mild SOB with walking, speaks normally in sentences, can lay down, no retractions, pulse < 100.     - MODERATE: SOB at rest, SOB with minimal exertion and prefers to sit, cannot lie down flat, speaks in phrases, mild retractions, audible wheezing, pulse 100-120.     - SEVERE: Very SOB at rest, speaks in single words, struggling to breathe, sitting hunched forward, retractions, pulse > 120       Moderate shortness of breath.    5. RECURRENT SYMPTOM: \"Have you had difficulty breathing before?\" If so, ask: \"When was the last time?\" and \"What happened that time?\"       Yes, sees a cardiologist at [facility].    6. CARDIAC HISTORY: \"Do you have any history of heart disease?\" (e.g., heart attack, angina, bypass surgery, angioplasty)       Yes, see chart    7. LUNG HISTORY: \"Do you have any history of lung disease?\"  (e.g., pulmonary embolus, asthma, emphysema)      *No Answer*  8. CAUSE: \"What do you think is causing the breathing problem?\"       Worried it's his heart.  9. OTHER SYMPTOMS: \"Do you have any other symptoms? (e.g., dizziness, runny nose, cough, chest pain, fever)      Denies.  10. " "PREGNANCY: \"Is there any chance you are pregnant?\" \"When was your last menstrual period?\"        No.  11. TRAVEL: \"Have you traveled out of the country in the last month?\" (e.g., travel history, exposures)        Unknown.    Answer Assessment - Initial Assessment Questions  See first assessment in this encounter.    Pt reports that he is very short of breath.  Walking to the garage, gets so tired that he has to stop and rest.  Feet very swollen.  Cough.  Sleeps sitting up; too hard to breathe laying down.    Back is hurting!  Eats and goes back to bed.  Spending 90% of time in bed which is not usual for pt.   Wheezing.  Difficulty breathing.    Advised ED.  Pt REFUSES.  Agrees to clinic appt today but I warned him he may still need to be seen in the ED.    Advised 911 if worsens.    Kelsie Magallanes RN    Protocols used: BREATHING DIFFICULTY-A-OH, CHEST PAIN-A-OH    "

## 2019-07-23 NOTE — TELEPHONE ENCOUNTER
"S-(situation): Pt arrived to see Dr Smith today for shortness of breath.  Dr Smith asked that I triage pt as dr was concerned that pt needed evaluation in the ED due to complaint of shortness of breath that has been significantly worse over the past week.    B-(background): see previous triage note.  In addition to shortness of breath, pt complains of a cough, dizziness, swollen ankles/feet, diminished energy, diminished appetite.  Pt says that his chest feels \"full of mucous\" and that he can only cough up a little at a time.  He complains of wheezing.  Pt says he has had cough for 2 years.    A-(assessment): I met with pt.    BP:  153/104  Pulse: 95, 96, and 93 on subsequent checks.  Slightly irregular rhythm.    96% ox saturation.    R-(recommendations):  Advised per Dr Smith that given his age and that pt is complaining of shortness of breath with exertion that causes him to stop and rest, pt needs to be evaluated in the ED where stat labs can be checked and imaging completed if needed.    Pt REFUSED ED.    Dr Smith agreed to see pt in clinic but pt elected to refuse this too.  Pt insists that he is feeling improved today compared to yesterday.      Pt reports that he has an appt with his cardiologist in 2 days at [facility] and that he will keep this appt.    Pt agrees to be seen in ED if worsens. He agrees to call 911 if breathing distress.      "

## 2019-07-23 NOTE — TELEPHONE ENCOUNTER
Left message for the patient to call the clinic.  Lucy DENSON RN            Red flag ???    ----- Message -----   From: Herson Mondragon   Sent: 7/22/2019   2:30 PM   To: [facility]   Subject: RE: Appointment Request                            Did you read my symptoms, the reason for a doctor appointment???? I think waiting until 8/9 is foolish with symptoms that include having trouble breathing. Should I try for an appointment with another Doctor?   ----- Message -----   From: Martina LENTZ   Sent: 7/22/2019 10:46 AM CDT   To: Herson Mondragon   Subject: RE: Appointment Request   Hello,       We have you scheduled for your appointment on 8/9/19 at 9:20 AM with Dr. Landers.      Please arrive a few minutes prior to your appointment time, and bring your [employer] insurance card (s) with to this appointment.      Thank you,   [facility]   [phone]      ----- Message -----      From: Herson Mondragon      Sent: 7/19/2019  6:24 PM CDT        To: Patient Appointment Schedule Request Mailing List   Subject: Appointment Request      Appointment Request From: Herson Mondragon      With Provider: Tiburcio Landers MD [[facility]]      Preferred Date Range: Any      Preferred Times: Any time      Reason for visit: Request an Appointment      Comments:   Extreme tiredness. Trouble breathing after exercise. Also med renewals.

## 2019-07-25 ENCOUNTER — TRANSFERRED RECORDS (OUTPATIENT)
Dept: HEALTH INFORMATION MANAGEMENT | Facility: CLINIC | Age: 79
End: 2019-07-25

## 2019-08-09 ENCOUNTER — OFFICE VISIT (OUTPATIENT)
Dept: FAMILY MEDICINE | Facility: CLINIC | Age: 79
End: 2019-08-09
Payer: COMMERCIAL

## 2019-08-09 VITALS
BODY MASS INDEX: 28.82 KG/M2 | TEMPERATURE: 97.5 F | OXYGEN SATURATION: 97 % | HEART RATE: 111 BPM | HEIGHT: 69 IN | WEIGHT: 194.6 LBS | SYSTOLIC BLOOD PRESSURE: 138 MMHG | DIASTOLIC BLOOD PRESSURE: 89 MMHG

## 2019-08-09 DIAGNOSIS — I50.20 SYSTOLIC CONGESTIVE HEART FAILURE, UNSPECIFIED HF CHRONICITY (H): Primary | ICD-10-CM

## 2019-08-09 DIAGNOSIS — E78.5 HYPERLIPIDEMIA LDL GOAL <100: ICD-10-CM

## 2019-08-09 DIAGNOSIS — I10 ESSENTIAL HYPERTENSION: ICD-10-CM

## 2019-08-09 DIAGNOSIS — F41.1 GENERALIZED ANXIETY DISORDER: ICD-10-CM

## 2019-08-09 PROCEDURE — 99214 OFFICE O/P EST MOD 30 MIN: CPT | Performed by: FAMILY MEDICINE

## 2019-08-09 RX ORDER — LORAZEPAM 0.5 MG/1
0.5 TABLET ORAL EVERY 6 HOURS PRN
Qty: 20 TABLET | Refills: 1 | Status: ON HOLD | OUTPATIENT
Start: 2019-08-09 | End: 2020-07-07

## 2019-08-09 RX ORDER — SIMVASTATIN 10 MG
TABLET ORAL
Qty: 90 TABLET | Refills: 3 | Status: SHIPPED | OUTPATIENT
Start: 2019-08-09 | End: 2019-10-16 | Stop reason: ALTCHOICE

## 2019-08-09 RX ORDER — HYDROCHLOROTHIAZIDE 25 MG/1
25 TABLET ORAL DAILY
Qty: 90 TABLET | Refills: 3 | Status: SHIPPED | OUTPATIENT
Start: 2019-08-09 | End: 2019-10-16

## 2019-08-09 RX ORDER — LISINOPRIL 10 MG/1
10 TABLET ORAL DAILY
Qty: 90 TABLET | Refills: 3 | Status: SHIPPED | OUTPATIENT
Start: 2019-08-09 | End: 2019-10-16

## 2019-08-09 RX ORDER — FUROSEMIDE 20 MG
20 TABLET ORAL
COMMUNITY
Start: 2019-08-02 | End: 2019-10-16

## 2019-08-09 ASSESSMENT — MIFFLIN-ST. JEOR: SCORE: 1579.11

## 2019-08-09 NOTE — PATIENT INSTRUCTIONS
(I50.20) Systolic congestive heart failure, unspecified HF chronicity (H)  (primary encounter diagnosis)  Comment:   Plan: CARDIOLOGY EVAL ADULT REFERRAL        Stop the aleve. Monitor the weight. Use the water pill called Lasix daily for one month. When the weight gets to the optimum, then try to sop Lasix.   Continue to monitor the weight indefinitely. You may need to restart the Lasix if the weight goes up over 5 lbs. Use the lower sodium diet. Exercise daily but   There is a threshold to avoid going over. The referral to Cardiology is done and call [phone] for the appt.

## 2019-08-09 NOTE — PROGRESS NOTES
"Subjective     Herson Mondragon is a 79 year old male who presents to clinic today for the following health issues:    HPI   Medication Followup     Taking Medication as prescribed: yes. Except the paxil- he is weening off.     Side Effects:  None    Medication Helping Symptoms:  Yes    The echo showed a LVEF of 32%, and LVH and aortic and mitral valve regurgitation.     There is global and LV hypokinesia. There was a small effusion.     He is better on the Lasix and with the weight loss.        Current Outpatient Medications:      fish oil-omega-3 fatty acids (FISH OIL) 1000 MG capsule, Take 2 g by mouth every morning., Disp: , Rfl:      furosemide (LASIX) 20 MG tablet, Take 20 mg by mouth, Disp: , Rfl:      hydrochlorothiazide (HYDRODIURIL) 25 MG tablet, TAKE 1 TABLET BY MOUTH ONCE DAILY, Disp: 30 tablet, Rfl: 0     lisinopril (PRINIVIL/ZESTRIL) 10 MG tablet, Take 1 tablet (10 mg) by mouth daily, Disp: 90 tablet, Rfl: 3     rivaroxaban ANTICOAGULANT (XARELTO) 20 MG TABS tablet, Take 20 mg by mouth, Disp: , Rfl:      simvastatin (ZOCOR) 10 MG tablet, TAKE 1 TABLET BY MOUTH AT BEDTIME . APPOINTMENT REQUIRED FOR FUTURE REFILLS, Disp: 30 tablet, Rfl: 0     LORazepam (ATIVAN) 0.5 MG tablet, Take 1 tablet (0.5 mg) by mouth every 6 hours as needed for anxiety (For flying.), Disp: 20 tablet, Rfl: 1     Multiple Vitamins-Minerals (ONE DAILY MENS) TABS, Take 1 tablet by mouth daily., Disp: , Rfl:     Patient Active Problem List   Diagnosis     Essential hypertension     Syncope and collapse     Atrial fibrillation (H)     Hyperlipidemia LDL goal <130     Generalized anxiety disorder     Shoulder arthritis     Sinus bradycardia     CKD (chronic kidney disease) stage 3, GFR 30-59 ml/min (H)     Systolic congestive heart failure, unspecified HF chronicity (H)       Blood pressure (!) 144/91, pulse 111, temperature 97.5  F (36.4  C), temperature source Tympanic, height 1.746 m (5' 8.75\"), weight 88.3 kg (194 lb 9.6 oz), SpO2 97 " %.    Exam:  GENERAL APPEARANCE: healthy, alert and no distress  EYES: EOMI,  PERRL  RESP: lungs clear to auscultation - no rales, rhonchi or wheezes  CV: regular rates and rhythm, normal S1 S2, no S3 or S4 and no murmur, click or rub -  PSYCH: mentation appears normal and affect normal/bright    (I50.20) Systolic congestive heart failure, unspecified HF chronicity (H)  (primary encounter diagnosis)  Comment:   Plan: CARDIOLOGY EVAL ADULT REFERRAL        Stop the aleve. Monitor the weight. Use the water pill called Lasix daily for one month. When the weight gets to the optimum, then try to sop Lasix.   Continue to monitor the weight indefinitely. You may need to restart the Lasix if the weight goes up over 5 lbs. Use the lower sodium diet. Exercise daily but   There is a threshold to avoid going over. The referral to Cardiology is done and call [phone] for the appt.       Tiburcio Landers

## 2019-08-16 ENCOUNTER — TELEPHONE (OUTPATIENT)
Dept: FAMILY MEDICINE | Facility: CLINIC | Age: 79
End: 2019-08-16

## 2019-08-16 NOTE — TELEPHONE ENCOUNTER
"----- Message from Jannet Flowers sent at 8/12/2019 10:24 AM CDT -----  Regarding: Dr Landers pt  He saw dr Landers 08/09 -   Patient has an order in for consult with cardiology - this pt already sees cardiology at [facility].  So now patient is a little confused why he should see a cardiologist here as well, that puts \"too many hands in the pot\".     Please let me know as soon as possible what to do with this referral, patients spouse is waiting for me to call her back with further directions.   "

## 2019-08-19 NOTE — TELEPHONE ENCOUNTER
I addressed this question last week. See the other note.   Pt has his choice on where to go. Either is OK with me.   Tiburcio Landers

## 2019-08-26 ENCOUNTER — TRANSFERRED RECORDS (OUTPATIENT)
Dept: HEALTH INFORMATION MANAGEMENT | Facility: CLINIC | Age: 79
End: 2019-08-26

## 2019-09-05 ENCOUNTER — TRANSFERRED RECORDS (OUTPATIENT)
Dept: HEALTH INFORMATION MANAGEMENT | Facility: CLINIC | Age: 79
End: 2019-09-05

## 2019-09-18 ENCOUNTER — OFFICE VISIT (OUTPATIENT)
Dept: FAMILY MEDICINE | Facility: CLINIC | Age: 79
End: 2019-09-18
Payer: COMMERCIAL

## 2019-09-18 VITALS
TEMPERATURE: 98.7 F | BODY MASS INDEX: 29.18 KG/M2 | HEART RATE: 56 BPM | OXYGEN SATURATION: 96 % | WEIGHT: 197 LBS | DIASTOLIC BLOOD PRESSURE: 68 MMHG | RESPIRATION RATE: 16 BRPM | HEIGHT: 69 IN | SYSTOLIC BLOOD PRESSURE: 118 MMHG

## 2019-09-18 DIAGNOSIS — E78.2 MIXED HYPERLIPIDEMIA: ICD-10-CM

## 2019-09-18 DIAGNOSIS — N18.30 CKD (CHRONIC KIDNEY DISEASE) STAGE 3, GFR 30-59 ML/MIN (H): ICD-10-CM

## 2019-09-18 DIAGNOSIS — I48.20 CHRONIC ATRIAL FIBRILLATION (H): ICD-10-CM

## 2019-09-18 DIAGNOSIS — I10 ESSENTIAL HYPERTENSION: ICD-10-CM

## 2019-09-18 DIAGNOSIS — Z01.818 PREOP GENERAL PHYSICAL EXAM: Primary | ICD-10-CM

## 2019-09-18 DIAGNOSIS — I25.10 CORONARY ARTERY DISEASE INVOLVING NATIVE HEART WITHOUT ANGINA PECTORIS, UNSPECIFIED VESSEL OR LESION TYPE: ICD-10-CM

## 2019-09-18 DIAGNOSIS — I50.20 SYSTOLIC CONGESTIVE HEART FAILURE, UNSPECIFIED HF CHRONICITY (H): ICD-10-CM

## 2019-09-18 PROCEDURE — 99215 OFFICE O/P EST HI 40 MIN: CPT | Performed by: FAMILY MEDICINE

## 2019-09-18 RX ORDER — ASPIRIN 81 MG/1
81 TABLET ORAL DAILY
COMMUNITY

## 2019-09-18 ASSESSMENT — MIFFLIN-ST. JEOR: SCORE: 1590

## 2019-09-18 NOTE — PROGRESS NOTES
[facility]  [address] [place] [address]  [phone]  Dept: [phone]    PRE-OP EVALUATION:  Today's date: 2019    Herson Mondragon (: 1940) presents for pre-operative evaluation assessment as requested by Dr. Olivares .  He requires evaluation and anesthesia risk assessment prior to undergoing surgery/procedure for treatment of coronary artery disease.    Proposed Surgery/ Procedure: coronary artery bypass grafting and Left Appendage clipping Surgery.   Date of Surgery/ Procedure: to be determined ( next week sometime)  Time of Surgery/ Procedure: to be determined  Hospital/Surgical Facility: [facility]   Fax number for surgical facility: [phone]  Primary Physician: Tiburcio Landers  Type of Anesthesia Anticipated: to be determined    Patient has a Health Care Directive or Living Will:  NO    1. NO - Do you have a history of heart attack, stroke, stent, bypass or surgery on an artery in the head, neck, heart or legs? Patient will be having CABG this surgery.  2. NO - Do you ever have any pain or discomfort in your chest?  3. NO - Do you have a history of  Heart Failure?  4. YES  - Are you troubled by shortness of breath when: walking on the level, up a slight hill or at night? Found out this is in relation to his CHF and CAD.  5. NO,- Do you currently have a cold, bronchitis or other respiratory infection?  6. YES occ dry cough  - Do you have a cough, shortness of breath or wheezing?  7. YES - Do you sometimes get pains in the calves of your legs when you walk? Only when he walks a long distance on the right leg, not at rest  8. NO - Do you or anyone in your family have previous history of blood clots?  9. NO - Do you or does anyone in your family have a serious bleeding problem such as prolonged bleeding following surgeries or cuts?  10. NO - Have you ever had problems with anemia or been told to take iron pills?  11. NO - Have you had any abnormal blood  loss such as black, tarry or bloody stools, or abnormal vaginal bleeding?  12. NO - Have you ever had a blood transfusion?  13. NO - Have you or any of your relatives ever had problems with anesthesia?  14. NO - Do you have sleep apnea, excessive snoring or daytime drowsiness?  15. NO - Do you have any prosthetic heart valves?  16. NO - Do you have prosthetic joints?  17. NO - Is there any chance that you may be pregnant?      HPI:     HPI related to upcoming procedure: Patient has coronary artery disease causing symptoms. Hence, planned surgery. Reviewed above with patient.      See problem list for active medical problems.  Problems all longstanding and stable, except as noted/documented.  See ROS for pertinent symptoms related to these conditions.    CAD - Patient has a longstanding history of moderate-severe CAD. Patient denies recent chest pain or NTG use, denies exercise induced dyspnea or PND.     Coronary angio 9/5/19  DIAGNOSTIC - CORONARY  * Multivessel coronary disease in a right dominant system  * 80% stenosis in the mid LAD  * 99% stenosis in the distal LAD  * 90% stenosis in the proximal Ramus  * 80% stenosis in the mid 1st Obtuse Marginal  * Chronic total occlusion (with bridging collaterals) of the proximal RCA  * 99% stenosis in the mid Circumflex    HYPERLIPIDEMIA - Patient has a long history of significant Hyperlipidemia requiring medication for treatment with recent good control. Patient reports no problems or side effects with the medication.     HYPERTENSION - Patient has longstanding history of HTN , currently denies any symptoms referable to elevated blood pressure. Specifically denies chest pain, palpitations, dyspnea, orthopnea, PND or peripheral edema. Blood pressure readings have been in normal range. Current medication regimen is as listed below. Patient denies any side effects of medication.     RENAL INSUFFICIENCY - Patient has a longstanding history of moderate-severe chronic renal  insufficiency. Last Cr at baseline and stable at 1.65.     ATRIAL FIB - chronic per patient; on long term xarelto. Rate controlled.    MEDICAL HISTORY:     Patient Active Problem List    Diagnosis Date Noted     Systolic congestive heart failure, unspecified HF chronicity (H) 08/09/2019     Priority: Medium     CKD (chronic kidney disease) stage 3, GFR 30-59 ml/min (H) 07/23/2019     Priority: Medium     Sinus bradycardia 05/15/2013     Priority: Medium     Shoulder arthritis 05/20/2011     Priority: Medium     May, 2009; see X-ray report and visit.        Generalized anxiety disorder 05/19/2011     Priority: Medium     Hyperlipidemia LDL goal <130 10/31/2010     Priority: Medium     Atrial fibrillation (H) 04/02/2008     Priority: Medium     Syncope and collapse 03/19/2008     Priority: Medium     See eval 10/06  Holterjelena  Carotid         Essential hypertension 10/14/2005     Priority: Medium     He has been on Atenolol and Metoprolol and had bradycardia. Catapres caused dry mouth. Hyzaar increased the creatinine. Diltiazem caused a rash and constipation.   Problem list name updated by automated process. Provider to review        Past Medical History:   Diagnosis Date     Calculus of kidney      Degeneration of lumbar or lumbosacral intervertebral disc      Duodenal ulcer, unspecified as acute or chronic, without hemorrhage, perforation, or obstruction 5/1995     Other and unspecified hyperlipidemia      Systolic congestive heart failure, unspecified HF chronicity (H) 8/9/2019     Unspecified essential hypertension      Past Surgical History:   Procedure Laterality Date     EYE SURGERY  10/2018    Cataract surgery in [place]     Current Outpatient Medications   Medication Sig Dispense Refill     aspirin 81 MG EC tablet Take 81 mg by mouth daily       fish oil-omega-3 fatty acids (FISH OIL) 1000 MG capsule Take 2 g by mouth every morning.       furosemide (LASIX) 20 MG tablet Take 20 mg by mouth        hydrochlorothiazide (HYDRODIURIL) 25 MG tablet Take 1 tablet (25 mg) by mouth daily (Patient taking differently: Take 12.5 mg by mouth daily ) 90 tablet 3     lisinopril (PRINIVIL/ZESTRIL) 10 MG tablet Take 1 tablet (10 mg) by mouth daily 90 tablet 3     LORazepam (ATIVAN) 0.5 MG tablet Take 1 tablet (0.5 mg) by mouth every 6 hours as needed for anxiety (For flying.) 20 tablet 1     Multiple Vitamins-Minerals (ONE DAILY MENS) TABS Take 1 tablet by mouth daily.       rivaroxaban ANTICOAGULANT (XARELTO) 20 MG TABS tablet Take 20 mg by mouth       simvastatin (ZOCOR) 10 MG tablet TAKE 1 TABLET BY MOUTH AT BEDTIME . APPOINTMENT REQUIRED FOR FUTURE REFILLS 90 tablet 3     OTC products: Aspirin    Allergies   Allergen Reactions     Atenolol Other (See Comments)     Bradycardia & exercise intol.     Catapres [Clonidine Derivatives] Other (See Comments)     Dry mouth     Metoprolol Succinate Other (See Comments)     bradycardia     Questran GI Disturbance     stomach      Latex Allergy: NO    Social History     Tobacco Use     Smoking status: Never Smoker     Smokeless tobacco: Never Used   Substance Use Topics     Alcohol use: No     History   Drug Use No       REVIEW OF SYSTEMS:   CONSTITUTIONAL: NEGATIVE for fever, chills or change in weight  INTEGUMENTARY/SKIN: NEGATIVE for worrisome rashes, moles or lesions  EYES: NEGATIVE for vision changes or irritation  ENT/MOUTH: NEGATIVE for ear, mouth and throat problems  RESP: NEGATIVE for significant cough or SOB  CV: NEGATIVE for chest pain, palpitations or peripheral edema  GI: NEGATIVE for nausea, abdominal pain, heartburn, or change in bowel habits  : NEGATIVE for frequency, dysuria, or hematuria  MUSCULOSKELETAL: NEGATIVE for significant arthralgias or myalgia  NEURO: NEGATIVE for weakness, dizziness or paresthesias  ENDOCRINE: NEGATIVE for temperature intolerance, skin/hair changes  HEME: NEGATIVE for bleeding problems  PSYCHIATRIC: NEGATIVE for changes in mood or  "affect    EXAM:   /68 (BP Location: Right arm, Patient Position: Chair, Cuff Size: Adult Regular)   Pulse 56   Temp 98.7  F (37.1  C) (Tympanic)   Resp 16   Ht 1.746 m (5' 8.75\")   Wt 89.4 kg (197 lb)   SpO2 96%   BMI 29.30 kg/m    GENERAL APPEARANCE:  alert and no distress; ambulatory w/o assist  EYES: pink conj, no icterus, PERRL, EOMI  HENT: ear canals and TM's normal, nose and mouth without ulcers or lesions, oropharynx clear and oral mucous membranes moist  NECK: no adenopathy, no asymmetry, masses, or scars and thyroid normal to palpation  RESP: lungs clear to auscultation - no rales, rhonchi or wheezes  CV: regular rates and rhythm, normal S1 S2, no S3 or S4, no murmur, click or rub, no peripheral edema and peripheral pulses strong  ABDOMEN: soft, nontender, no hepatosplenomegaly, no masses and bowel sounds normal  MS: no musculoskeletal defects are noted and gait is age appropriate without ataxia  SKIN: good turgor, no rash/jaundice/ecchymosis  NEURO: Normal strength and tone, sensory exam grossly normal, mentation intact and speech normal    DIAGNOSTICS:     EK2019 showed Atrial fibrillation with premature ventricular or aberrantly conducted complexes  Left axis deviation  Left bundle branch block    Labs Resulted Today:   Results for orders placed or performed in visit on 19   Lipid panel reflex to direct LDL Fasting   Result Value Ref Range    Cholesterol 126 <200 mg/dL    Triglycerides 64 <150 mg/dL    HDL Cholesterol 45 >39 mg/dL    LDL Cholesterol Calculated 68 <100 mg/dL    Non HDL Cholesterol 81 <130 mg/dL   **Basic metabolic panel FUTURE 1yr   Result Value Ref Range    Sodium 137 133 - 144 mmol/L    Potassium 3.8 3.4 - 5.3 mmol/L    Chloride 103 94 - 109 mmol/L    Carbon Dioxide 29 20 - 32 mmol/L    Anion Gap 5 3 - 14 mmol/L    Glucose 97 70 - 99 mg/dL    Urea Nitrogen 26 7 - 30 mg/dL    Creatinine 1.66 (H) 0.66 - 1.25 mg/dL    GFR Estimate 38 (L) >60 mL/min/[1.73_m2]    " GFR Estimate If Black 44 (L) >60 mL/min/[1.73_m2]    Calcium 9.5 8.5 - 10.1 mg/dL       Recent Labs   Lab Test 07/23/19  1026 07/24/18  1030     --    POTASSIUM 3.8 3.9   CR 1.66* 1.49*        IMPRESSION:   Reason for surgery/procedure: multivessel coronary artery disease  Diagnosis/reason for consult: preop evaluation, hypertension, a-fib, CKD, hyperlipidemia, CHF    The proposed surgical procedure is considered HIGH risk.    REVISED CARDIAC RISK INDEX  The patient has the following serious cardiovascular risks for perioperative complications such as (MI, PE, VFib and 3  AV Block):  High risk surgery (>5% cardiac complication risk)  Coronary Artery Disease (MI, positive stress test, angina, Qs on EKG)  Congestive Heart Failure (pulmonary edema, PND, s3 can, CXR with pulmonary congestion, basilar rales)  INTERPRETATION: 2 risks: Class III (moderate risk - 6.6% complication rate)    The patient has the following additional risks for perioperative complications:  The ASCVD Risk score (Arcata MARY Jr., et al., 2013) failed to calculate for the following reasons:    The 2013 ASCVD risk score is only valid for ages 40 to 79  On anticoagulation      ICD-10-CM    1. Preop general physical exam Z01.818    2. Coronary artery disease involving native heart without angina pectoris, unspecified vessel or lesion type I25.10    3. Essential hypertension I10 Controlled.   4. Mixed hyperlipidemia E78.2 Controlled.   5. Systolic congestive heart failure, unspecified HF chronicity (H) I50.20 Compensated. Follow heart healthy lifestyle. No additional testing prior tos urgery.   6. CKD (chronic kidney disease) stage 3, GFR 30-59 ml/min (H) N18.3 Stable.   7. Chronic atrial fibrillation (H) I48.2 Rate controlled. On xaarelto which patient will stop at least 24 hours prior to surgery.             RECOMMENDATIONS:     --Consult hospital rounder / IM to assist post-op medical management  Routine DVT precautions  recommended.    Cardiovascular Risk  Performs 4 METs exercise without symptoms (Light housework (dusting, washing dishes)) .   Patient has been seen extensively by cardiology and thoracovascular surgery, and has been recommended to have the CABG done.  Derek Miller called thoracic surgery clinic and obtained more information about patient's preoperative needs. She was advised patient ruiz snot need to see cardiology before surgery. Patient will have additional tests recommended by the surgeon next week. Patient just needs a medical H&P prior to surgery as his cardiac issues are being addressed by them.  Patient has no acute symptoms today and did not show any clinical compromise. He appeared to be in good spirits and has no exertional symptoms.    --Patient is to take all scheduled medications on the day of surgery EXCEPT for modifications listed below.    Anticoagulant or Antiplatelet Medication Use  ASPIRIN: patient will talk to thoracic surgery about holding this medication. He has significant CAD and is at high risk of thrombosis  XARELTO: Bleeding risk is at least moderate for this procedure and rivaroxaban (Xarelto) should be stopped at least 24 hours prior to procedure. Patient said he was told by surgeon to stop it 4 days prior. Patient to verify with them when he meets them for further tests.        ACE Inhibitor or Angiotensin Receptor Blocker (ARB) Use  Ace inhibitor or Angiotensin Receptor Blocker (ARB) and will continue this medication due to the higher risk of uncontrolled perioperative hypertension.      APPROVAL GIVEN to proceed with proposed procedure, without further diagnostic evaluation       Signed Electronically by: Jack Smith MD    Copy of this evaluation report is provided to requesting physician.    [place] Preop Guidelines    Revised Cardiac Risk Index

## 2019-09-18 NOTE — PATIENT INSTRUCTIONS
Stop xarelto 4 days before surgery per your vascular surgeon instructions.  When you see them next week, ask about stopping aspirin before surgery.    Hold morning medications on day of surgery unless instructed otherwise by your cardiologist or surgeon.    No additional testing needed today from a medical standpoint.    Before Your Surgery      Call your surgeon if there is any change in your health. This includes signs of a cold or flu (such as a sore throat, runny nose, cough, rash or fever).    Do not smoke, drink alcohol or take over the counter medicine (unless your surgeon or primary care doctor tells you to) for the 24 hours before and after surgery.    If you take prescribed drugs: Follow your doctor s orders about which medicines to take and which to stop until after surgery.    Eating and drinking prior to surgery: follow the instructions from your surgeon    Take a shower or bath the night before surgery. Use the soap your surgeon gave you to gently clean your skin. If you do not have soap from your surgeon, use your regular soap. Do not shave or scrub the surgery site.  Wear clean pajamas and have clean sheets on your bed.         Thank you for choosing [facility].  You may be receiving an email and/or telephone survey request from [facility] Customer Experience regarding your visit today.  Please take a few minutes to respond to the survey to let us know how we are doing.      If you have questions or concerns, please contact us via [employer] or you can contact your care team at [phone].    Our Clinic hours are:  Monday 6:40 am  to 7:00 pm  Tuesday -Friday 6:40 am to 5:00 pm    The [place] outpatient lab hours are:  Monday - Friday 6:10 am to 4:45 pm  Saturdays 7:00 am to 11:00 am  Appointments are required, call [phone]      If you have clinical questions after hours or would like to schedule an appointment,  call the clinic at [phone].

## 2019-09-24 PROBLEM — I25.10 CORONARY ARTERY DISEASE INVOLVING NATIVE HEART WITHOUT ANGINA PECTORIS, UNSPECIFIED VESSEL OR LESION TYPE: Status: ACTIVE | Noted: 2019-09-24

## 2019-09-25 ENCOUNTER — TRANSFERRED RECORDS (OUTPATIENT)
Dept: HEALTH INFORMATION MANAGEMENT | Facility: CLINIC | Age: 79
End: 2019-09-25

## 2019-09-29 ENCOUNTER — HEALTH MAINTENANCE LETTER (OUTPATIENT)
Age: 79
End: 2019-09-29

## 2019-10-01 ENCOUNTER — TRANSFERRED RECORDS (OUTPATIENT)
Dept: HEALTH INFORMATION MANAGEMENT | Facility: CLINIC | Age: 79
End: 2019-10-01

## 2019-10-08 ENCOUNTER — TRANSFERRED RECORDS (OUTPATIENT)
Dept: HEALTH INFORMATION MANAGEMENT | Facility: CLINIC | Age: 79
End: 2019-10-08

## 2019-10-16 ENCOUNTER — OFFICE VISIT (OUTPATIENT)
Dept: FAMILY MEDICINE | Facility: CLINIC | Age: 79
End: 2019-10-16
Payer: COMMERCIAL

## 2019-10-16 ENCOUNTER — HOSPITAL ENCOUNTER (OUTPATIENT)
Dept: MRI IMAGING | Facility: CLINIC | Age: 79
Discharge: HOME | End: 2019-10-16
Attending: FAMILY MEDICINE | Admitting: FAMILY MEDICINE
Payer: COMMERCIAL

## 2019-10-16 VITALS
HEART RATE: 60 BPM | HEIGHT: 69 IN | OXYGEN SATURATION: 95 % | WEIGHT: 207 LBS | SYSTOLIC BLOOD PRESSURE: 134 MMHG | TEMPERATURE: 97.7 F | BODY MASS INDEX: 30.66 KG/M2 | DIASTOLIC BLOOD PRESSURE: 72 MMHG

## 2019-10-16 DIAGNOSIS — M79.661 PAIN OF RIGHT LOWER LEG: Primary | ICD-10-CM

## 2019-10-16 DIAGNOSIS — I25.10 CORONARY ARTERY DISEASE INVOLVING NATIVE HEART WITHOUT ANGINA PECTORIS, UNSPECIFIED VESSEL OR LESION TYPE: ICD-10-CM

## 2019-10-16 DIAGNOSIS — Z23 NEED FOR PROPHYLACTIC VACCINATION AND INOCULATION AGAINST INFLUENZA: ICD-10-CM

## 2019-10-16 DIAGNOSIS — M79.661 PAIN OF RIGHT LOWER LEG: ICD-10-CM

## 2019-10-16 DIAGNOSIS — I10 ESSENTIAL HYPERTENSION: ICD-10-CM

## 2019-10-16 PROCEDURE — 99214 OFFICE O/P EST MOD 30 MIN: CPT | Mod: 25 | Performed by: FAMILY MEDICINE

## 2019-10-16 PROCEDURE — 90662 IIV NO PRSV INCREASED AG IM: CPT | Performed by: FAMILY MEDICINE

## 2019-10-16 PROCEDURE — G0008 ADMIN INFLUENZA VIRUS VAC: HCPCS | Performed by: FAMILY MEDICINE

## 2019-10-16 PROCEDURE — 72148 MRI LUMBAR SPINE W/O DYE: CPT

## 2019-10-16 RX ORDER — FUROSEMIDE 20 MG
20 TABLET ORAL DAILY
Qty: 90 TABLET | Refills: 3 | Status: SHIPPED | OUTPATIENT
Start: 2019-10-16 | End: 2021-10-06

## 2019-10-16 RX ORDER — ROSUVASTATIN CALCIUM 20 MG/1
20 TABLET, COATED ORAL AT BEDTIME
Refills: 3 | COMMUNITY
Start: 2019-09-29 | End: 2019-10-16

## 2019-10-16 RX ORDER — CARVEDILOL 12.5 MG/1
12.5 TABLET ORAL 2 TIMES DAILY
COMMUNITY
Start: 2019-09-29

## 2019-10-16 RX ORDER — ACETAMINOPHEN 500 MG
1000 TABLET ORAL 3 TIMES DAILY
COMMUNITY
Start: 2019-09-29 | End: 2023-09-15

## 2019-10-16 RX ORDER — HYDROMORPHONE HYDROCHLORIDE 2 MG/1
2-4 TABLET ORAL EVERY 4 HOURS PRN
COMMUNITY
Start: 2019-10-14 | End: 2019-10-16 | Stop reason: DRUGHIGH

## 2019-10-16 RX ORDER — ROSUVASTATIN CALCIUM 20 MG/1
20 TABLET, COATED ORAL AT BEDTIME
Qty: 90 TABLET | Refills: 3 | Status: SHIPPED | OUTPATIENT
Start: 2019-10-16 | End: 2020-11-02

## 2019-10-16 RX ORDER — FAMOTIDINE 20 MG/1
20 TABLET, FILM COATED ORAL DAILY
COMMUNITY
Start: 2019-09-30 | End: 2020-05-18

## 2019-10-16 RX ORDER — HYDROMORPHONE HYDROCHLORIDE 2 MG/1
2 TABLET ORAL EVERY 4 HOURS PRN
Refills: 0 | COMMUNITY
Start: 2019-10-16 | End: 2020-05-18

## 2019-10-16 RX ORDER — AMOXICILLIN 250 MG
2 CAPSULE ORAL 2 TIMES DAILY PRN
COMMUNITY
Start: 2019-10-03 | End: 2020-05-18

## 2019-10-16 RX ORDER — WARFARIN SODIUM 2.5 MG/1
2.5 TABLET ORAL
COMMUNITY
Start: 2019-10-07 | End: 2020-05-18

## 2019-10-16 RX ORDER — GABAPENTIN 100 MG/1
1 CAPSULE ORAL 3 TIMES DAILY PRN
Refills: 1 | COMMUNITY
Start: 2019-09-29 | End: 2020-05-18

## 2019-10-16 RX ORDER — LISINOPRIL 10 MG/1
10 TABLET ORAL DAILY
Qty: 90 TABLET | Refills: 3 | Status: SHIPPED | OUTPATIENT
Start: 2019-10-16 | End: 2020-05-18

## 2019-10-16 ASSESSMENT — MIFFLIN-ST. JEOR: SCORE: 1635.36

## 2019-10-16 NOTE — PATIENT INSTRUCTIONS
Thank you for choosing [facility].  You may be receiving an email and/or telephone survey request from [facility] Customer Experience regarding your visit today.  Please take a few minutes to respond to the survey to let us know how we are doing.      If you have questions or concerns, please contact us via [employer] or you can contact your care team at [phone].    Our Clinic hours are:  Monday 6:40 am  to 7:00 pm  Tuesday -Friday 6:40 am to 5:00 pm    The [place] outpatient lab hours are:  Monday - Friday 6:10 am to 4:45 pm  Saturdays 7:00 am to 11:00 am  Appointments are required, call [phone]    If you have clinical questions after hours or would like to schedule an appointment,  call the clinic at [phone].    (M79.661) Pain of right lower leg  (primary encounter diagnosis)  Comment:   Plan: MR Lumbar Spine w/o Contrast        We discussed the likely causes and there could be pressure on the right lumbar L4-5 nerve going down the leg.   Avoid repetitive bending and twisting and lifting. The MRI of the lower back is ordered. We will call the results and recommendations.     (I25.10) Coronary artery disease involving native heart without angina pectoris, unspecified vessel or lesion type  Comment:   Plan: furosemide (LASIX) 20 MG tablet, rosuvastatin         (CRESTOR) 20 MG tablet        You are doing well and monitor for symptoms of angina as discussed. Stay on the lower chol diet and daily exercise and Crestor.   Refills are done for one year.     (I10) Essential hypertension  Comment:   Plan: furosemide (LASIX) 20 MG tablet, lisinopril         (PRINIVIL/ZESTRIL) 10 MG tablet        Stay on the meds and monitor the BP at rest. The goal for the average is under 130/80.   Use the non drug therapies. If the BP is low then consideration for reducing Lisinopril from 10 mg to 5 mg would be done.   Just call our RN at [phone].

## 2019-10-19 ENCOUNTER — MYC MEDICAL ADVICE (OUTPATIENT)
Dept: FAMILY MEDICINE | Facility: CLINIC | Age: 79
End: 2019-10-19

## 2019-10-21 NOTE — TELEPHONE ENCOUNTER
Dr Landers, patient says he contacted cardiologist. (Dr Herminia Boykin) and she is supposed to contact you directly.     Patient saying he wants Epidural ordered, to be done here or in [place] if after Nov 2nd. Thanks,   Chantale Stoner RNC

## 2019-10-21 NOTE — TELEPHONE ENCOUNTER
Dr Landers, please see his mychart note about going off coumadin,     I think he is referring to the result notes from his MRI lumbar.   I did send him another note to ask if he spoke with cardiology for the ok to hold coumadin,   He sees cardiology at [facility] vascular [facility].     Chantale Stoner RNC

## 2019-10-22 ENCOUNTER — MYC MEDICAL ADVICE (OUTPATIENT)
Dept: FAMILY MEDICINE | Facility: CLINIC | Age: 79
End: 2019-10-22

## 2019-10-23 NOTE — TELEPHONE ENCOUNTER
Left message at [facility]   [address] [place], [place], [place], [address]  [phone]  For Arelis Springer Cnp or covering provider for cardiology to return call to this clinic today and speak with Dr. Landers per his request.  dr. garcia is [phone]   [phone]    Dixie RIVERO Rn

## 2019-10-23 NOTE — TELEPHONE ENCOUNTER
I spoke with Cardiology. The Warfarin can be stopped 4 days before the procedure and start the Xarelto per Cardiology the day after the procedure.     Tiburcio Landers MD  .

## 2019-10-23 NOTE — TELEPHONE ENCOUNTER
Assuming there are no complications, such as bleeding. Then he should feel well after the procedure. The benefit takes up to 1-2 weeks to happen. He should be fine to do this before leaving for [place]. It is his choice on where to do this. See the other note on his starting Xarelto per Cardiology.     Tiburcio Landers MD

## 2019-10-23 NOTE — TELEPHONE ENCOUNTER
Please check the records. There have been so many messages on this I do not know! He should have had this referred a long time ago; weeks ago. Tiburcio Landers MD

## 2019-10-23 NOTE — TELEPHONE ENCOUNTER
See the note from several days ago. We are awaiting a recommendation from his Cardiologist, Dr. Arelis Boykin, about this. Please call her office and get me on the phone. Another colleague is acceptable. Inform the pt of what we are doing. .Tiburcio Landers MD

## 2019-10-23 NOTE — TELEPHONE ENCOUNTER
Notified patient's wife, Lashay, of provider's recommendations.  She verbalized understanding and she reports they will be looking to address this in [place] since the closest Pain clinics do not have opening for their need. (See other telephone note 10/19/19)    Dixie RIVERO Rn

## 2019-10-23 NOTE — TELEPHONE ENCOUNTER
Patient's wife Lashay reports she would like the Pain referral for [place] [facility] [place] or [place].  Called the pain management clinic [facility], they do not have any appts next week, the [place] clinic is closed permanently.  Patient's wife notified of this   She verbalized understanding, patient will be addressing this after they go to [place].  Advised that she call the cardiologist clinic and discuss the Warfarin, since patient has already stopped this medication on 10/19/19, and was not advised to stop until 4 days before the epidural procedure, which is not scheduled yet.  Cardiology is the prescriber and should advise on this.  Next she should call medical records for a release of information prior to going to [place], phone number given [phone].  If a disc of the MRI is needed [phone] should be called to obtain this.  Lastly make an appointment to see a provider in [place] for right leg pain and get referral for Pain management there.  Lashay understood these recommendations and verbalized understanding and agreed with this plan.

## 2019-10-23 NOTE — TELEPHONE ENCOUNTER
Patient gave verbal permission to speak to his wife, Lashay.  She was notified of recommendations regarding medications.  She verbalized understanding and reports that Arelis Springer called her today and told her that patient is scheduled Nov 2 for epidural procedure with PCP in clinic and patient has already gone off of the Warfarin.  Advised that she call cardiology back about Warfarin and get those recommendations on if to restart and go off 4 days before procedure.  She verbalized understanding  Can you clarify if patient is getting injection with you or needs referral for injection at a pain clinic?    Routing to provider.    Dixie RIVERO Rn          [phone] Lashay

## 2019-10-31 ENCOUNTER — TRANSFERRED RECORDS (OUTPATIENT)
Dept: HEALTH INFORMATION MANAGEMENT | Facility: CLINIC | Age: 79
End: 2019-10-31

## 2020-03-15 ENCOUNTER — HEALTH MAINTENANCE LETTER (OUTPATIENT)
Age: 80
End: 2020-03-15

## 2020-04-30 ENCOUNTER — TELEPHONE (OUTPATIENT)
Dept: FAMILY MEDICINE | Facility: CLINIC | Age: 80
End: 2020-04-30

## 2020-04-30 DIAGNOSIS — I10 ESSENTIAL HYPERTENSION: Primary | ICD-10-CM

## 2020-04-30 DIAGNOSIS — I25.10 CORONARY ARTERY DISEASE INVOLVING NATIVE HEART WITHOUT ANGINA PECTORIS, UNSPECIFIED VESSEL OR LESION TYPE: ICD-10-CM

## 2020-05-01 DIAGNOSIS — I10 ESSENTIAL HYPERTENSION: ICD-10-CM

## 2020-05-01 DIAGNOSIS — I25.10 CORONARY ARTERY DISEASE INVOLVING NATIVE HEART WITHOUT ANGINA PECTORIS, UNSPECIFIED VESSEL OR LESION TYPE: ICD-10-CM

## 2020-05-01 LAB
ALT SERPL W P-5'-P-CCNC: 31 U/L (ref 0–70)
CHOLEST SERPL-MCNC: 97 MG/DL
CREAT SERPL-MCNC: 1.44 MG/DL (ref 0.66–1.25)
GFR SERPL CREATININE-BSD FRML MDRD: 45 ML/MIN/{1.73_M2}
HDLC SERPL-MCNC: 50 MG/DL
LDLC SERPL CALC-MCNC: 32 MG/DL
NONHDLC SERPL-MCNC: 47 MG/DL
TRIGL SERPL-MCNC: 76 MG/DL

## 2020-05-01 PROCEDURE — 80061 LIPID PANEL: CPT | Performed by: FAMILY MEDICINE

## 2020-05-01 PROCEDURE — 36415 COLL VENOUS BLD VENIPUNCTURE: CPT | Performed by: FAMILY MEDICINE

## 2020-05-01 PROCEDURE — 82565 ASSAY OF CREATININE: CPT | Performed by: FAMILY MEDICINE

## 2020-05-01 PROCEDURE — 84460 ALANINE AMINO (ALT) (SGPT): CPT | Performed by: FAMILY MEDICINE

## 2020-05-18 ENCOUNTER — OFFICE VISIT (OUTPATIENT)
Dept: FAMILY MEDICINE | Facility: CLINIC | Age: 80
End: 2020-05-18
Payer: COMMERCIAL

## 2020-05-18 VITALS
TEMPERATURE: 97.9 F | SYSTOLIC BLOOD PRESSURE: 156 MMHG | OXYGEN SATURATION: 97 % | BODY MASS INDEX: 27.7 KG/M2 | RESPIRATION RATE: 20 BRPM | HEART RATE: 59 BPM | WEIGHT: 187 LBS | HEIGHT: 69 IN | DIASTOLIC BLOOD PRESSURE: 88 MMHG

## 2020-05-18 DIAGNOSIS — F41.9 ANXIETY: ICD-10-CM

## 2020-05-18 DIAGNOSIS — L30.9 DERMATITIS: ICD-10-CM

## 2020-05-18 DIAGNOSIS — K40.20 BILATERAL INGUINAL HERNIA WITHOUT OBSTRUCTION OR GANGRENE, RECURRENCE NOT SPECIFIED: Primary | ICD-10-CM

## 2020-05-18 DIAGNOSIS — M47.816 OSTEOARTHRITIS OF LUMBAR SPINE, UNSPECIFIED SPINAL OSTEOARTHRITIS COMPLICATION STATUS: ICD-10-CM

## 2020-05-18 PROCEDURE — 99214 OFFICE O/P EST MOD 30 MIN: CPT | Performed by: FAMILY MEDICINE

## 2020-05-18 RX ORDER — TAMSULOSIN HYDROCHLORIDE 0.4 MG/1
0.4 CAPSULE ORAL DAILY
COMMUNITY
Start: 2020-05-18 | End: 2023-10-09

## 2020-05-18 ASSESSMENT — ANXIETY QUESTIONNAIRES
IF YOU CHECKED OFF ANY PROBLEMS ON THIS QUESTIONNAIRE, HOW DIFFICULT HAVE THESE PROBLEMS MADE IT FOR YOU TO DO YOUR WORK, TAKE CARE OF THINGS AT HOME, OR GET ALONG WITH OTHER PEOPLE: NOT DIFFICULT AT ALL
GAD7 TOTAL SCORE: 6
7. FEELING AFRAID AS IF SOMETHING AWFUL MIGHT HAPPEN: NOT AT ALL
3. WORRYING TOO MUCH ABOUT DIFFERENT THINGS: SEVERAL DAYS
2. NOT BEING ABLE TO STOP OR CONTROL WORRYING: SEVERAL DAYS
5. BEING SO RESTLESS THAT IT IS HARD TO SIT STILL: NOT AT ALL
6. BECOMING EASILY ANNOYED OR IRRITABLE: SEVERAL DAYS
1. FEELING NERVOUS, ANXIOUS, OR ON EDGE: MORE THAN HALF THE DAYS

## 2020-05-18 ASSESSMENT — MIFFLIN-ST. JEOR: SCORE: 1539.64

## 2020-05-18 ASSESSMENT — PATIENT HEALTH QUESTIONNAIRE - PHQ9
SUM OF ALL RESPONSES TO PHQ QUESTIONS 1-9: 5
5. POOR APPETITE OR OVEREATING: SEVERAL DAYS

## 2020-05-18 NOTE — PROGRESS NOTES
Subjective     Herson Mondragon is a 80 year old male who presents to clinic today for the following health issues:    HPI   Chief Complaint   Patient presents with     Mass     Lump in the groin area-bilteral.  He is not sure if he has a hernia.  This started 1-2 weeks after his heart surgery.  States this will go from side to side for the symptoms.  He had issues following his heart surgery with bowel movements and having to push harder.  He has noticed when lifting something that the lumps will be there.     Derm Problem     Check of skin areas on the legs and foot.  He is not sure if he has eczema or something else.  The skin areas do itch and are dry and red.       Anxiety     Rech on Ativan. While in [place] he was prescribed Ativan to help with anxiety symptoms. This was due to how he was feeling after his heart surgery and recovery with problems with breathing.  It was prescribed at a 1 mg tablet twice daily.  He was taking it at a lower dose and would like to discuss about a refill at the lower dose.     Other     Back pain and fatigue-He has been feeling fatigue with back aches and pain.  He states he feels he didn't recover well after his surgery as he was not able to continue with the exercises weekly due to the clinic closing due to Covid 19.  He feels some of the fatigue is related to that.    The lumbar MRI in October, 2019 showed:   IMPRESSION:    1. Multilevel degenerative change as described above.  2. The most significant degenerative changes appear to be at the L4-L5  level where there is degeneration of the disc, diffuse annular disc  bulge, degenerative change in the facet joints which is allowing 3 mm  of anterior spondylolisthesis. These changes are leading to severe  central spinal stenosis and severe bilateral subarticular recess  stenosis. There is also severe right foraminal stenosis at this level.     Derm Problem     He states the skin on his back itches.  His wife will put cream on his  "back.  He is not sure if that could be caused from a medication he is taking.       Current Outpatient Medications   Medication Instructions     acetaminophen (TYLENOL) 1,000 mg, Oral, 3 TIMES DAILY     aspirin 81 mg, Oral, DAILY     carvedilol (COREG) 12.5 mg, Oral, 2 TIMES DAILY     furosemide (LASIX) 20 mg, Oral, DAILY     LORazepam (ATIVAN) 0.5 mg, Oral, EVERY 6 HOURS PRN     Multiple Vitamins-Minerals (ONE DAILY MENS) TABS 1 tablet, DAILY     NEW MED Probiotic daily.     rivaroxaban ANTICOAGULANT (XARELTO ANTICOAGULANT) 20 mg, Oral, DAILY WITH SUPPER     rosuvastatin (CRESTOR) 20 mg, Oral, AT BEDTIME     tamsulosin (FLOMAX) 0.4 mg, Oral, DAILY       Patient Active Problem List   Diagnosis     Essential hypertension     Syncope and collapse     Atrial fibrillation (H)     Hyperlipidemia LDL goal <130     Generalized anxiety disorder     Shoulder arthritis     Sinus bradycardia     CKD (chronic kidney disease) stage 3, GFR 30-59 ml/min (H)     Systolic congestive heart failure, unspecified HF chronicity (H)     Coronary artery disease involving native heart without angina pectoris, unspecified vessel or lesion type       Blood pressure (!) 156/88, pulse 59, temperature 97.9  F (36.6  C), temperature source Tympanic, resp. rate 20, height 1.746 m (5' 8.75\"), weight 84.8 kg (187 lb), SpO2 97 %.    Exam:  GENERAL APPEARANCE: healthy, alert and no distress  EYES: EOMI,  PERRL  HENT: ear canals and TM's normal and nose and mouth without ulcers or lesions  MS: decreased range of motion of the lumbar spine.   SKIN: there are several red and dry patches on the lower legs and feet. No drainage. No blisters.   PSYCH: mentation appears normal and affect normal/bright    (K40.20) Bilateral inguinal hernia without obstruction or gangrene, recurrence not specified  (primary encounter diagnosis)  Comment:   Plan: GENERAL SURG ADULT REFERRAL, PHYSICAL THERAPY         REFERRAL: We discussed the hernias and avoid straining and " heavy lifting. Referral is done         to our surgeons and go to [facility] or call [phone].   If there is incarceration then go to the nearest Emergency Dept right away.     (K40.485) Osteoarthritis of lumbar spine, unspecified spinal osteoarthritis complication status  Comment:   Plan: For the lower back we discussed the modification of activities and avoid repetitive bending and twisting and lifting. Use Tylenol and ice in the midline and heat on any tight muscles.   Physical therapy is ordered. If that is not effective quickly, then consideration for a steroid injection in the area would be done. Just call our clinic RN.     (L30.9) Dermatitis  Comment:   Plan: For the skin of the lower legs use hypoallergenic products, and consider the moisturizing cream. You may the steroid cream in a thin film twice daily for 4-5 days.   If not better we may need to do a 2 mm punch biopsy.     (F41.9) Anxiety  Comment:   Plan: In regard to Ativan we would like to get you off this medication. You should use decaffeinated products, and avoid stimulants. You should exercise daily.   You were on Paxil in the past and that could be taken if desired. Just call the clinic RN at [phone] and 10 mg daily of Paxil will be ordered. This is not addicting.       Tiburcio Landers MD

## 2020-05-18 NOTE — PATIENT INSTRUCTIONS
(K40.20) Bilateral inguinal hernia without obstruction or gangrene, recurrence not specified  (primary encounter diagnosis)  Comment:   Plan: GENERAL SURG ADULT REFERRAL, PHYSICAL THERAPY         REFERRAL: We discussed the hernias and avoid straining and heavy lifting. Referral is done         to our surgeons and go to [facility] or call [phone].   If there is incarceration then go to the nearest Emergency Dept right away.     (M49.166) Osteoarthritis of lumbar spine, unspecified spinal osteoarthritis complication status  Comment:   Plan: For the lower back we discussed the modification of activities and avoid repetitive bending and twisting and lifting. Use Tylenol and ice in the midline and heat on any tight muscles.   Physical therapy is ordered. If that is not effective quickly, then consideration for a steroid injection in the area would be done. Just call our clinic RN.     (L30.9) Dermatitis  Comment:   Plan: For the skin of the lower legs use hypoallergenic products, and consider the moisturizing cream. You may the steroid cream in a thin film twice daily for 4-5 days.   If not better we may need to do a 2 mm punch biopsy.     (F41.9) Anxiety  Comment:   Plan: In regard to Ativan we would like to get you off this medication. You should use decaffeinated products, and avoid stimulants. You should exercise daily.   You were on Paxil in the past and that could be taken if desired. Just call the clinic RN at [phone] and 10 mg daily of Paxil will be ordered. This is not addicting.

## 2020-05-19 ASSESSMENT — ANXIETY QUESTIONNAIRES: GAD7 TOTAL SCORE: 6

## 2020-06-03 ENCOUNTER — HOSPITAL ENCOUNTER (OUTPATIENT)
Facility: CLINIC | Age: 80
End: 2020-06-03
Attending: SURGERY | Admitting: SURGERY
Payer: COMMERCIAL

## 2020-06-03 ENCOUNTER — OFFICE VISIT (OUTPATIENT)
Dept: SURGERY | Facility: CLINIC | Age: 80
End: 2020-06-03
Attending: FAMILY MEDICINE
Payer: COMMERCIAL

## 2020-06-03 VITALS
TEMPERATURE: 97.6 F | BODY MASS INDEX: 27.7 KG/M2 | HEART RATE: 57 BPM | DIASTOLIC BLOOD PRESSURE: 85 MMHG | HEIGHT: 69 IN | SYSTOLIC BLOOD PRESSURE: 143 MMHG | WEIGHT: 187 LBS

## 2020-06-03 DIAGNOSIS — K40.20 BILATERAL INGUINAL HERNIA WITHOUT OBSTRUCTION OR GANGRENE, RECURRENCE NOT SPECIFIED: ICD-10-CM

## 2020-06-03 DIAGNOSIS — Z11.59 ENCOUNTER FOR SCREENING FOR OTHER VIRAL DISEASES: Primary | ICD-10-CM

## 2020-06-03 PROCEDURE — 99204 OFFICE O/P NEW MOD 45 MIN: CPT | Performed by: SURGERY

## 2020-06-03 ASSESSMENT — MIFFLIN-ST. JEOR: SCORE: 1539.64

## 2020-06-03 NOTE — NURSING NOTE
"Initial BP (!) 143/85 (BP Location: Right arm, Patient Position: Sitting, Cuff Size: Adult Regular)   Pulse 57   Temp 97.6  F (36.4  C) (Tympanic)   Ht 1.746 m (5' 8.75\")   Wt 84.8 kg (187 lb)   BMI 27.82 kg/m   Estimated body mass index is 27.82 kg/m  as calculated from the following:    Height as of this encounter: 1.746 m (5' 8.75\").    Weight as of this encounter: 84.8 kg (187 lb). .    Harmony Foster MA    "

## 2020-06-03 NOTE — LETTER
6/3/2020         RE: Herson Mondragon  [address]  [facility] [address]        Dear Colleague,    Thank you for referring your patient, Herson Mondragon, to the [facility]. Please see a copy of my visit note below.    80-year-old male complaining of 9 months history of increasing groin masses.  Patient had bypass surgery last fall and began having these masses progressively enlarged.  They are reducible.  Because localized discomfort 1-2 out of 10 with radiation to the testicles.  Aggravated by straining and alleviated by rest.  Patient is a very active octogenarian and would like to have this fixed.  He is taking Xarelto after his bypass surgery.    Patient Active Problem List   Diagnosis     Essential hypertension     Syncope and collapse     Atrial fibrillation (H)     Hyperlipidemia LDL goal <130     Generalized anxiety disorder     Shoulder arthritis     Sinus bradycardia     CKD (chronic kidney disease) stage 3, GFR 30-59 ml/min (H)     Systolic congestive heart failure, unspecified HF chronicity (H)     Coronary artery disease involving native heart without angina pectoris, unspecified vessel or lesion type     Dermatitis     Anxiety     Osteoarthritis of lumbar spine, unspecified spinal osteoarthritis complication status       Past Medical History:   Diagnosis Date     Calculus of kidney      Degeneration of lumbar or lumbosacral intervertebral disc      Duodenal ulcer, unspecified as acute or chronic, without hemorrhage, perforation, or obstruction 1995     Other and unspecified hyperlipidemia      Systolic congestive heart failure, unspecified HF chronicity (H) 2019     Unspecified essential hypertension        Past Surgical History:   Procedure Laterality Date     EYE SURGERY  10/2018    Cataract surgery in [place]       Family History   Problem Relation Age of Onset     C.A.D. Father          of heart failure     Breast Cancer Brother        Social History     Tobacco  Use     Smoking status: Never Smoker     Smokeless tobacco: Never Used   Substance Use Topics     Alcohol use: No        History   Drug Use No       Current Outpatient Medications   Medication Sig Dispense Refill     acetaminophen (TYLENOL) 500 MG tablet Take 1,000 mg by mouth 3 times daily       aspirin 81 MG EC tablet Take 81 mg by mouth daily       carvedilol (COREG) 12.5 MG tablet Take 12.5 mg by mouth 2 times daily       furosemide (LASIX) 20 MG tablet Take 1 tablet (20 mg) by mouth daily 90 tablet 3     LORazepam (ATIVAN) 0.5 MG tablet Take 1 tablet (0.5 mg) by mouth every 6 hours as needed for anxiety (For flying.) 20 tablet 1     Multiple Vitamins-Minerals (ONE DAILY MENS) TABS Take 1 tablet by mouth daily.       NEW MED Probiotic daily.       rivaroxaban ANTICOAGULANT (XARELTO ANTICOAGULANT) 20 MG TABS tablet Take 1 tablet (20 mg) by mouth daily (with dinner)       rosuvastatin (CRESTOR) 20 MG tablet Take 1 tablet (20 mg) by mouth At Bedtime 90 tablet 3     tamsulosin (FLOMAX) 0.4 MG capsule Take 1 capsule (0.4 mg) by mouth daily         Allergies   Allergen Reactions     Atenolol Other (See Comments)     Bradycardia & exercise intol.     Catapres [Clonidine Derivatives] Other (See Comments)     Dry mouth     Metoprolol Succinate Other (See Comments)     bradycardia     Questran GI Disturbance     stomach       ROS  Constitutional - Denies fevers, weight loss, malaise, lethargy  Neuro - Denies tremors or seizures  Pulmon - Denies SOB, dyspnea, hemoptysis, chronic cough or use of an inhaler  CV - CABG in the past  GI - Denies hematemesis, BRBPR, melena, chronic diarrhea or epigastric pain   - Denies hematuria, difficulty voiding, h/o STDs  Hematology - Denies blood clotting disorders, chronic anemias  Dermatology - No melanomas or skin cancers  Rheumatology - No h/o RA  Pysch - Denies depression, bipolar d/o or schizophrenia    A/P: 80-year-old male with reducible bilateral inguinal hernias.  Patient is  good candidate for laparoscopic repair.  Patient will need a preoperative exam from his primary care provider and need to be off his Xarelto for at least 5 days prior.  Risks benefits alternatives and complications were discussed with the patient including the possibility of infection bleeding or hernia recurrence.  Patient understood and wished to proceed.     Al Cyr MD     Again, thank you for allowing me to participate in the care of your patient.        Sincerely,        Al Cyr MD

## 2020-06-03 NOTE — PROGRESS NOTES
80-year-old male complaining of 9 months history of increasing groin masses.  Patient had bypass surgery last fall and began having these masses progressively enlarged.  They are reducible.  Because localized discomfort 1-2 out of 10 with radiation to the testicles.  Aggravated by straining and alleviated by rest.  Patient is a very active octogenarian and would like to have this fixed.  He is taking Xarelto after his bypass surgery.    Patient Active Problem List   Diagnosis     Essential hypertension     Syncope and collapse     Atrial fibrillation (H)     Hyperlipidemia LDL goal <130     Generalized anxiety disorder     Shoulder arthritis     Sinus bradycardia     CKD (chronic kidney disease) stage 3, GFR 30-59 ml/min (H)     Systolic congestive heart failure, unspecified HF chronicity (H)     Coronary artery disease involving native heart without angina pectoris, unspecified vessel or lesion type     Dermatitis     Anxiety     Osteoarthritis of lumbar spine, unspecified spinal osteoarthritis complication status       Past Medical History:   Diagnosis Date     Calculus of kidney      Degeneration of lumbar or lumbosacral intervertebral disc      Duodenal ulcer, unspecified as acute or chronic, without hemorrhage, perforation, or obstruction 1995     Other and unspecified hyperlipidemia      Systolic congestive heart failure, unspecified HF chronicity (H) 2019     Unspecified essential hypertension        Past Surgical History:   Procedure Laterality Date     EYE SURGERY  10/2018    Cataract surgery in [place]       Family History   Problem Relation Age of Onset     C.A.D. Father          of heart failure     Breast Cancer Brother        Social History     Tobacco Use     Smoking status: Never Smoker     Smokeless tobacco: Never Used   Substance Use Topics     Alcohol use: No        History   Drug Use No       Current Outpatient Medications   Medication Sig Dispense Refill     acetaminophen (TYLENOL) 500  "MG tablet Take 1,000 mg by mouth 3 times daily       aspirin 81 MG EC tablet Take 81 mg by mouth daily       carvedilol (COREG) 12.5 MG tablet Take 12.5 mg by mouth 2 times daily       furosemide (LASIX) 20 MG tablet Take 1 tablet (20 mg) by mouth daily 90 tablet 3     LORazepam (ATIVAN) 0.5 MG tablet Take 1 tablet (0.5 mg) by mouth every 6 hours as needed for anxiety (For flying.) 20 tablet 1     Multiple Vitamins-Minerals (ONE DAILY MENS) TABS Take 1 tablet by mouth daily.       NEW MED Probiotic daily.       rivaroxaban ANTICOAGULANT (XARELTO ANTICOAGULANT) 20 MG TABS tablet Take 1 tablet (20 mg) by mouth daily (with dinner)       rosuvastatin (CRESTOR) 20 MG tablet Take 1 tablet (20 mg) by mouth At Bedtime 90 tablet 3     tamsulosin (FLOMAX) 0.4 MG capsule Take 1 capsule (0.4 mg) by mouth daily         Allergies   Allergen Reactions     Atenolol Other (See Comments)     Bradycardia & exercise intol.     Catapres [Clonidine Derivatives] Other (See Comments)     Dry mouth     Metoprolol Succinate Other (See Comments)     bradycardia     Questran GI Disturbance     stomach       ROS  Constitutional - Denies fevers, weight loss, malaise, lethargy  Neuro - Denies tremors or seizures  Pulmon - Denies SOB, dyspnea, hemoptysis, chronic cough or use of an inhaler  CV - CABG in the past  GI - Denies hematemesis, BRBPR, melena, chronic diarrhea or epigastric pain   - Denies hematuria, difficulty voiding, h/o STDs  Hematology - Denies blood clotting disorders, chronic anemias  Dermatology - No melanomas or skin cancers  Rheumatology - No h/o RA  Pysch - Denies depression, bipolar d/o or schizophrenia    Exam:BP (!) 143/85 (BP Location: Right arm, Patient Position: Sitting, Cuff Size: Adult Regular)   Pulse 57   Temp 97.6  F (36.4  C) (Tympanic)   Ht 1.746 m (5' 8.75\")   Wt 84.8 kg (187 lb)   BMI 27.82 kg/m      General - Alert and Oriented X4, NAD, well nourished  HEENT - Normocephalic, atraumatic, PERRLA, Nose " midline, Throat without lesions  Neck - supple, no LAD, Thyroid normal, Carotids without bruits  Lungs - Clear to auscultation bilaterally with good inspiratory effort, no tactile fremitus  CV - Heart RRR, no lift's, thrills, murmurs, rubs, or gallops.   Abdomen - Soft, non-tender, +BS, no hepatosplenomegaly, no palpable masses  Groins -palpable reducible masses bilaterally  Neuro - Full ROM, Strength 5/5 and major muscle groups, sensation intact  Extremities - No cyanosis, clubbing or edema      A/P: 80-year-old male with reducible bilateral inguinal hernias.  Patient is good candidate for laparoscopic repair.  Patient will need a preoperative exam from his primary care provider and need to be off his Xarelto for at least 5 days prior.  Risks benefits alternatives and complications were discussed with the patient including the possibility of infection bleeding or hernia recurrence.  Patient understood and wished to proceed.     Al Cyr MD

## 2020-06-09 ENCOUNTER — MYC MEDICAL ADVICE (OUTPATIENT)
Dept: SURGERY | Facility: CLINIC | Age: 80
End: 2020-06-09

## 2020-06-15 ENCOUNTER — OFFICE VISIT (OUTPATIENT)
Dept: FAMILY MEDICINE | Facility: CLINIC | Age: 80
End: 2020-06-15
Payer: COMMERCIAL

## 2020-06-15 VITALS
TEMPERATURE: 97.8 F | HEART RATE: 56 BPM | SYSTOLIC BLOOD PRESSURE: 158 MMHG | HEIGHT: 69 IN | BODY MASS INDEX: 26.96 KG/M2 | WEIGHT: 182 LBS | DIASTOLIC BLOOD PRESSURE: 80 MMHG | RESPIRATION RATE: 20 BRPM | OXYGEN SATURATION: 98 %

## 2020-06-15 DIAGNOSIS — N23 KIDNEY PAIN: ICD-10-CM

## 2020-06-15 DIAGNOSIS — Z01.818 PREOP GENERAL PHYSICAL EXAM: Primary | ICD-10-CM

## 2020-06-15 LAB
ALBUMIN UR-MCNC: 30 MG/DL
APPEARANCE UR: ABNORMAL
BACTERIA #/AREA URNS HPF: ABNORMAL /HPF
BASOPHILS # BLD AUTO: 0 10E9/L (ref 0–0.2)
BASOPHILS NFR BLD AUTO: 0.4 %
BILIRUB UR QL STRIP: NEGATIVE
COLOR UR AUTO: ABNORMAL
CREAT SERPL-MCNC: 1.39 MG/DL (ref 0.66–1.25)
DIFFERENTIAL METHOD BLD: ABNORMAL
EOSINOPHIL # BLD AUTO: 0.5 10E9/L (ref 0–0.7)
EOSINOPHIL NFR BLD AUTO: 6.9 %
ERYTHROCYTE [DISTWIDTH] IN BLOOD BY AUTOMATED COUNT: 15.7 % (ref 10–15)
GFR SERPL CREATININE-BSD FRML MDRD: 47 ML/MIN/{1.73_M2}
GLUCOSE UR STRIP-MCNC: NEGATIVE MG/DL
HCT VFR BLD AUTO: 40.6 % (ref 40–53)
HGB BLD-MCNC: 13.3 G/DL (ref 13.3–17.7)
HGB UR QL STRIP: ABNORMAL
KETONES UR STRIP-MCNC: NEGATIVE MG/DL
LEUKOCYTE ESTERASE UR QL STRIP: ABNORMAL
LYMPHOCYTES # BLD AUTO: 1.9 10E9/L (ref 0.8–5.3)
LYMPHOCYTES NFR BLD AUTO: 25.4 %
MCH RBC QN AUTO: 30.9 PG (ref 26.5–33)
MCHC RBC AUTO-ENTMCNC: 32.8 G/DL (ref 31.5–36.5)
MCV RBC AUTO: 94 FL (ref 78–100)
MONOCYTES # BLD AUTO: 1 10E9/L (ref 0–1.3)
MONOCYTES NFR BLD AUTO: 13.1 %
NEUTROPHILS # BLD AUTO: 4 10E9/L (ref 1.6–8.3)
NEUTROPHILS NFR BLD AUTO: 54.2 %
NITRATE UR QL: NEGATIVE
NON-SQ EPI CELLS #/AREA URNS LPF: ABNORMAL /LPF
PH UR STRIP: 6.5 PH (ref 5–7)
PLATELET # BLD AUTO: 190 10E9/L (ref 150–450)
RBC # BLD AUTO: 4.3 10E12/L (ref 4.4–5.9)
RBC #/AREA URNS AUTO: >100 /HPF
SOURCE: ABNORMAL
SP GR UR STRIP: 1.01 (ref 1–1.03)
UROBILINOGEN UR STRIP-ACNC: 0.2 EU/DL (ref 0.2–1)
WBC # BLD AUTO: 7.4 10E9/L (ref 4–11)
WBC #/AREA URNS AUTO: ABNORMAL /HPF

## 2020-06-15 PROCEDURE — 82565 ASSAY OF CREATININE: CPT | Performed by: FAMILY MEDICINE

## 2020-06-15 PROCEDURE — 36415 COLL VENOUS BLD VENIPUNCTURE: CPT | Performed by: FAMILY MEDICINE

## 2020-06-15 PROCEDURE — 81001 URINALYSIS AUTO W/SCOPE: CPT | Performed by: FAMILY MEDICINE

## 2020-06-15 PROCEDURE — 93000 ELECTROCARDIOGRAM COMPLETE: CPT | Performed by: FAMILY MEDICINE

## 2020-06-15 PROCEDURE — 99214 OFFICE O/P EST MOD 30 MIN: CPT | Performed by: FAMILY MEDICINE

## 2020-06-15 PROCEDURE — 85025 COMPLETE CBC W/AUTO DIFF WBC: CPT | Performed by: FAMILY MEDICINE

## 2020-06-15 ASSESSMENT — MIFFLIN-ST. JEOR: SCORE: 1516.96

## 2020-06-15 NOTE — H&P (VIEW-ONLY)
[facility]  [address] [place] [address]  [phone]  Dept: [phone]    PRE-OP EVALUATION:  Today's date: 6/15/2020    Herson Mondragon (: 1940) presents for pre-operative evaluation assessment as requested by Dr. Cyr.  He requires evaluation and anesthesia risk assessment prior to undergoing surgery/procedure for treatment of bilateral inguinal hernia.    Proposed Surgery/ Procedure: Laparoscopic bilateral inguinal hernia repair.  Date of Surgery/ Procedure: 2020.  Time of Surgery/ Procedure: He has his paperwork at home.  Hospital/Surgical Facility: [facility]  Fax number for surgical facility: No fax needed, able to view in [employer].  Primary Physician: Tiburcio Landers  Type of Anesthesia Anticipated: General    Patient has a Health Care Directive or Living Will:  NO    1. YES - Do you have a history of heart attack, stroke, stent, bypass or surgery on an artery in the head, neck, heart or legs? Bypass.  2. NO - Do you ever have any pain or discomfort in your chest?  3. NO - Do you have a history of  Heart Failure?  4. YES - Are you troubled by shortness of breath when: walking on the level, up a slight hill or at night?  5. YES - Do you currently have a cold, bronchitis or other respiratory infection?  States he is always coughing up phlegm.  This has been for 5 years.  Not sure if allergy related?  6. NO - Do you have a cough, shortness of breath or wheezing?  7. NO - Do you sometimes get pains in the calves of your legs when you walk?  8. NO - Do you or anyone in your family have previous history of blood clots?  9. NO - Do you or does anyone in your family have a serious bleeding problem such as prolonged bleeding following surgeries or cuts?  10. NO - Have you ever had problems with anemia or been told to take iron pills?  11. NO - Have you had any abnormal blood loss such as black, tarry or bloody stools, or abnormal vaginal bleeding?  12. NO - Have you ever  had a blood transfusion?  13. NO - Have you or any of your relatives ever had problems with anesthesia?  14. NO - Do you have sleep apnea, excessive snoring or daytime drowsiness?  15. NO - Do you have any prosthetic heart valves?  16. NO - Do you have prosthetic joints?      HPI:     HPI related to upcoming procedure: see above.       See problem list for active medical problems.  Problems all longstanding and stable, except as noted/documented.  See ROS for pertinent symptoms related to these conditions.      MEDICAL HISTORY:     Patient Active Problem List    Diagnosis Date Noted     Bilateral inguinal hernia without obstruction or gangrene, recurrence not specified 06/03/2020     Priority: Medium     Added automatically from request for surgery [id number]       Dermatitis 05/18/2020     Priority: Medium     Anxiety 05/18/2020     Priority: Medium     Osteoarthritis of lumbar spine, unspecified spinal osteoarthritis complication status 05/18/2020     Priority: Medium     Coronary artery disease involving native heart without angina pectoris, unspecified vessel or lesion type 09/24/2019     Priority: Medium     Systolic congestive heart failure, unspecified HF chronicity (H) 08/09/2019     Priority: Medium     CKD (chronic kidney disease) stage 3, GFR 30-59 ml/min (H) 07/23/2019     Priority: Medium     Sinus bradycardia 05/15/2013     Priority: Medium     Shoulder arthritis 05/20/2011     Priority: Medium     May, 2009; see X-ray report and visit.        Generalized anxiety disorder 05/19/2011     Priority: Medium     Hyperlipidemia LDL goal <130 10/31/2010     Priority: Medium     Atrial fibrillation (H) 04/02/2008     Priority: Medium     Syncope and collapse 03/19/2008     Priority: Medium     See eval 10/06  Holterjelenat  Carotid         Essential hypertension 10/14/2005     Priority: Medium     He has been on Atenolol and Metoprolol and had bradycardia. Catapres caused dry mouth. Hyzaar increased the  creatinine. Diltiazem caused a rash and constipation.   Problem list name updated by automated process. Provider to review        Past Medical History:   Diagnosis Date     Calculus of kidney      Degeneration of lumbar or lumbosacral intervertebral disc      Duodenal ulcer, unspecified as acute or chronic, without hemorrhage, perforation, or obstruction 5/1995     Other and unspecified hyperlipidemia      Systolic congestive heart failure, unspecified HF chronicity (H) 8/9/2019     Unspecified essential hypertension      Past Surgical History:   Procedure Laterality Date     EYE SURGERY  10/2018    Cataract surgery in [place]     Current Outpatient Medications   Medication Sig Dispense Refill     acetaminophen (TYLENOL) 500 MG tablet Take 1,000 mg by mouth 3 times daily       aspirin 81 MG EC tablet Take 81 mg by mouth daily       carvedilol (COREG) 12.5 MG tablet Take 12.5 mg by mouth 2 times daily       furosemide (LASIX) 20 MG tablet Take 1 tablet (20 mg) by mouth daily 90 tablet 3     LORazepam (ATIVAN) 0.5 MG tablet Take 1 tablet (0.5 mg) by mouth every 6 hours as needed for anxiety (For flying.) 20 tablet 1     Multiple Vitamins-Minerals (ONE DAILY MENS) TABS Take 1 tablet by mouth daily.       NEW MED Probiotic daily.       rivaroxaban ANTICOAGULANT (XARELTO ANTICOAGULANT) 20 MG TABS tablet Take 1 tablet (20 mg) by mouth daily (with dinner)       rosuvastatin (CRESTOR) 20 MG tablet Take 1 tablet (20 mg) by mouth At Bedtime 90 tablet 3     tamsulosin (FLOMAX) 0.4 MG capsule Take 1 capsule (0.4 mg) by mouth daily       OTC products: None, except as noted above    Allergies   Allergen Reactions     Atenolol Other (See Comments)     Bradycardia & exercise intol.     Catapres [Clonidine Derivatives] Other (See Comments)     Dry mouth     Metoprolol Succinate Other (See Comments)     bradycardia     Questran GI Disturbance     stomach      Latex Allergy: NO    Social History     Tobacco Use     Smoking status:  "Never Smoker     Smokeless tobacco: Never Used   Substance Use Topics     Alcohol use: No     History   Drug Use No       REVIEW OF SYSTEMS:   CONSTITUTIONAL: NEGATIVE for fever, chills, change in weight  ENT/MOUTH: NEGATIVE for ear, mouth and throat problems  RESP: NEGATIVE for significant cough or SOB  CV: NEGATIVE for chest pain, palpitations or peripheral edema  : hematuria with bright red blood in the urine starting 3 days ago. No dysuria.     EXAM:   BP (!) 158/80   Pulse 56   Temp 97.8  F (36.6  C) (Tympanic)   Resp 20   Ht 1.746 m (5' 8.75\")   Wt 82.6 kg (182 lb)   SpO2 98%   BMI 27.07 kg/m    Exam:  GENERAL APPEARANCE: healthy, alert and no distress  EYES: EOMI,  PERRL  HENT: ear canals and TM's normal and nose and mouth without ulcers or lesions  NECK: no adenopathy, no asymmetry, masses, or scars and thyroid normal to palpation  RESP: lungs clear to auscultation - no rales, rhonchi or wheezes  CV: regular rates and rhythm, normal S1 S2, no S3 or S4 and no murmur, click or rub -  ABDOMEN:  soft, nontender, no HSM or masses and bowel sounds normal; CVA and  Bladder areas are not tender.   MS: extremities normal- no gross deformities noted, no evidence of inflammation in joints, FROM in all extremities.  SKIN: no suspicious lesions or rashes  PSYCH: mentation appears normal and affect normal/bright      DIAGNOSTICS:     EKst degree AV block, normal axis, normal intervals, no acute ST/T changes c/w ischemia, no LVH by voltage criteria, Left Bundle Branch Block  Labs Drawn and in Process:   Unresulted Labs Ordered in the Past 30 Days of this Admission     No orders found from 2020 to 2020.          Recent Labs   Lab Test 20  1137 19  1026 18  1030   NA  --  137  --    POTASSIUM  --  3.8 3.9   CR 1.44* 1.66* 1.49*        IMPRESSION:   Reason for surgery/procedure: Herson Mondragon (: 1940) presents for pre-operative evaluation assessment as requested by Dr. Cyr.  " He requires evaluation and anesthesia risk assessment prior to undergoing surgery/procedure for treatment of bilateral inguinal hernia.    (R31.9) Hematuria  Comment:   Plan: *UA reflex to Microscopic and Culture   Plan: CT Abdomen Pelvis w Contrast, UROLOGY ADULT         REFERRAL        See below.         The proposed surgical procedure is considered INTERMEDIATE risk.    REVISED CARDIAC RISK INDEX  The patient has the following serious cardiovascular risks for perioperative complications such as (MI, PE, VFib and 3  AV Block):      ICD-10-CM    1. Preop general physical exam  Z01.818        RECOMMENDATIONS:       --Patient is to take all scheduled medications on the day before surgery EXCEPT for modifications listed below.  Take no aspirin or advil or aleve for one week before surgery. Tylenol is OK.   Follow the Cardiology instructions for stopping Xarelto before surgery.     APPROVAL is pending to proceed with proposed procedure, with further diagnostic evaluation.  The CT of the abdomen is ordered, and call [phone] to schedule, or just go to Diagnostics.   The Urology referral is ordered, and call [phone] to schedule.   These will need to be done before clearing you.     Addendum on 6-24-20: he has been cleared by Urology for surgery. We will notify him and the surgeon.   Tiburcio Landers MD         Signed Electronically by: Tiburcio Landers MD    Copy of this evaluation report is provided to requesting physician.    [place] Preop Guidelines    Revised Cardiac Risk Index

## 2020-06-15 NOTE — LETTER
June 22, 2020      Herson Mondragon  [address] [facility] [place] [address]        Dear ,    We are writing to inform you of your test results.    The CBC is fine.   The creatinine is mildly better.   He is OK for surgery,       If you have any questions or concerns, please call the clinic at the number listed above.       Sincerely,        Tiburcio Landers MD

## 2020-06-15 NOTE — PROGRESS NOTES
[facility]  [address] [address]  [phone]  Dept: [phone]    PRE-OP EVALUATION:  Today's date: 6/15/2020    Herson Mondragon (: 1940) presents for pre-operative evaluation assessment as requested by Dr. Cyr.  He requires evaluation and anesthesia risk assessment prior to undergoing surgery/procedure for treatment of bilateral inguinal hernia.    Proposed Surgery/ Procedure: Laparoscopic bilateral inguinal hernia repair.  Date of Surgery/ Procedure: 2020.  Time of Surgery/ Procedure: He has his paperwork at home.  Hospital/Surgical Facility: [facility]  Fax number for surgical facility: No fax needed, able to view in [employer].  Primary Physician: Tiburcio Landers  Type of Anesthesia Anticipated: General    Patient has a Health Care Directive or Living Will:  NO    1. YES - Do you have a history of heart attack, stroke, stent, bypass or surgery on an artery in the head, neck, heart or legs? Bypass.  2. NO - Do you ever have any pain or discomfort in your chest?  3. NO - Do you have a history of  Heart Failure?  4. YES - Are you troubled by shortness of breath when: walking on the level, up a slight hill or at night?  5. YES - Do you currently have a cold, bronchitis or other respiratory infection?  States he is always coughing up phlegm.  This has been for 5 years.  Not sure if allergy related?  6. NO - Do you have a cough, shortness of breath or wheezing?  7. NO - Do you sometimes get pains in the calves of your legs when you walk?  8. NO - Do you or anyone in your family have previous history of blood clots?  9. NO - Do you or does anyone in your family have a serious bleeding problem such as prolonged bleeding following surgeries or cuts?  10. NO - Have you ever had problems with anemia or been told to take iron pills?  11. NO - Have you had any abnormal blood loss such as black, tarry or bloody stools, or abnormal vaginal bleeding?  12. NO - Have you ever  had a blood transfusion?  13. NO - Have you or any of your relatives ever had problems with anesthesia?  14. NO - Do you have sleep apnea, excessive snoring or daytime drowsiness?  15. NO - Do you have any prosthetic heart valves?  16. NO - Do you have prosthetic joints?      HPI:     HPI related to upcoming procedure: see above.       See problem list for active medical problems.  Problems all longstanding and stable, except as noted/documented.  See ROS for pertinent symptoms related to these conditions.      MEDICAL HISTORY:     Patient Active Problem List    Diagnosis Date Noted     Bilateral inguinal hernia without obstruction or gangrene, recurrence not specified 06/03/2020     Priority: Medium     Added automatically from request for surgery [id number]       Dermatitis 05/18/2020     Priority: Medium     Anxiety 05/18/2020     Priority: Medium     Osteoarthritis of lumbar spine, unspecified spinal osteoarthritis complication status 05/18/2020     Priority: Medium     Coronary artery disease involving native heart without angina pectoris, unspecified vessel or lesion type 09/24/2019     Priority: Medium     Systolic congestive heart failure, unspecified HF chronicity (H) 08/09/2019     Priority: Medium     CKD (chronic kidney disease) stage 3, GFR 30-59 ml/min (H) 07/23/2019     Priority: Medium     Sinus bradycardia 05/15/2013     Priority: Medium     Shoulder arthritis 05/20/2011     Priority: Medium     May, 2009; see X-ray report and visit.        Generalized anxiety disorder 05/19/2011     Priority: Medium     Hyperlipidemia LDL goal <130 10/31/2010     Priority: Medium     Atrial fibrillation (H) 04/02/2008     Priority: Medium     Syncope and collapse 03/19/2008     Priority: Medium     See eval 10/06  Holterjelenat  Carotid         Essential hypertension 10/14/2005     Priority: Medium     He has been on Atenolol and Metoprolol and had bradycardia. Catapres caused dry mouth. Hyzaar increased the  creatinine. Diltiazem caused a rash and constipation.   Problem list name updated by automated process. Provider to review        Past Medical History:   Diagnosis Date     Calculus of kidney      Degeneration of lumbar or lumbosacral intervertebral disc      Duodenal ulcer, unspecified as acute or chronic, without hemorrhage, perforation, or obstruction 5/1995     Other and unspecified hyperlipidemia      Systolic congestive heart failure, unspecified HF chronicity (H) 8/9/2019     Unspecified essential hypertension      Past Surgical History:   Procedure Laterality Date     EYE SURGERY  10/2018    Cataract surgery in [place]     Current Outpatient Medications   Medication Sig Dispense Refill     acetaminophen (TYLENOL) 500 MG tablet Take 1,000 mg by mouth 3 times daily       aspirin 81 MG EC tablet Take 81 mg by mouth daily       carvedilol (COREG) 12.5 MG tablet Take 12.5 mg by mouth 2 times daily       furosemide (LASIX) 20 MG tablet Take 1 tablet (20 mg) by mouth daily 90 tablet 3     LORazepam (ATIVAN) 0.5 MG tablet Take 1 tablet (0.5 mg) by mouth every 6 hours as needed for anxiety (For flying.) 20 tablet 1     Multiple Vitamins-Minerals (ONE DAILY MENS) TABS Take 1 tablet by mouth daily.       NEW MED Probiotic daily.       rivaroxaban ANTICOAGULANT (XARELTO ANTICOAGULANT) 20 MG TABS tablet Take 1 tablet (20 mg) by mouth daily (with dinner)       rosuvastatin (CRESTOR) 20 MG tablet Take 1 tablet (20 mg) by mouth At Bedtime 90 tablet 3     tamsulosin (FLOMAX) 0.4 MG capsule Take 1 capsule (0.4 mg) by mouth daily       OTC products: None, except as noted above    Allergies   Allergen Reactions     Atenolol Other (See Comments)     Bradycardia & exercise intol.     Catapres [Clonidine Derivatives] Other (See Comments)     Dry mouth     Metoprolol Succinate Other (See Comments)     bradycardia     Questran GI Disturbance     stomach      Latex Allergy: NO    Social History     Tobacco Use     Smoking status:  "Never Smoker     Smokeless tobacco: Never Used   Substance Use Topics     Alcohol use: No     History   Drug Use No       REVIEW OF SYSTEMS:   CONSTITUTIONAL: NEGATIVE for fever, chills, change in weight  ENT/MOUTH: NEGATIVE for ear, mouth and throat problems  RESP: NEGATIVE for significant cough or SOB  CV: NEGATIVE for chest pain, palpitations or peripheral edema  : hematuria with bright red blood in the urine starting 3 days ago. No dysuria.     EXAM:   BP (!) 158/80   Pulse 56   Temp 97.8  F (36.6  C) (Tympanic)   Resp 20   Ht 1.746 m (5' 8.75\")   Wt 82.6 kg (182 lb)   SpO2 98%   BMI 27.07 kg/m    Exam:  GENERAL APPEARANCE: healthy, alert and no distress  EYES: EOMI,  PERRL  HENT: ear canals and TM's normal and nose and mouth without ulcers or lesions  NECK: no adenopathy, no asymmetry, masses, or scars and thyroid normal to palpation  RESP: lungs clear to auscultation - no rales, rhonchi or wheezes  CV: regular rates and rhythm, normal S1 S2, no S3 or S4 and no murmur, click or rub -  ABDOMEN:  soft, nontender, no HSM or masses and bowel sounds normal; CVA and  Bladder areas are not tender.   MS: extremities normal- no gross deformities noted, no evidence of inflammation in joints, FROM in all extremities.  SKIN: no suspicious lesions or rashes  PSYCH: mentation appears normal and affect normal/bright      DIAGNOSTICS:     EKst degree AV block, normal axis, normal intervals, no acute ST/T changes c/w ischemia, no LVH by voltage criteria, Left Bundle Branch Block  Labs Drawn and in Process:   Unresulted Labs Ordered in the Past 30 Days of this Admission     No orders found from 2020 to 2020.          Recent Labs   Lab Test 20  1137 19  1026 18  1030   NA  --  137  --    POTASSIUM  --  3.8 3.9   CR 1.44* 1.66* 1.49*        IMPRESSION:   Reason for surgery/procedure: Herson Mondragon (: 1940) presents for pre-operative evaluation assessment as requested by Dr. Cyr.  " He requires evaluation and anesthesia risk assessment prior to undergoing surgery/procedure for treatment of bilateral inguinal hernia.    (R31.9) Hematuria  Comment:   Plan: *UA reflex to Microscopic and Culture   Plan: CT Abdomen Pelvis w Contrast, UROLOGY ADULT         REFERRAL        See below.         The proposed surgical procedure is considered INTERMEDIATE risk.    REVISED CARDIAC RISK INDEX  The patient has the following serious cardiovascular risks for perioperative complications such as (MI, PE, VFib and 3  AV Block):      ICD-10-CM    1. Preop general physical exam  Z01.818        RECOMMENDATIONS:       --Patient is to take all scheduled medications on the day before surgery EXCEPT for modifications listed below.  Take no aspirin or advil or aleve for one week before surgery. Tylenol is OK.   Follow the Cardiology instructions for stopping Xarelto before surgery.     APPROVAL is pending to proceed with proposed procedure, with further diagnostic evaluation.  The CT of the abdomen is ordered, and call [phone] to schedule, or just go to Diagnostics.   The Urology referral is ordered, and call [phone] to schedule.   These will need to be done before clearing you.     Addendum on 6-24-20: he has been cleared by Urology for surgery. We will notify him and the surgeon.   Tiburcio Landers MD         Signed Electronically by: Tiburcio Landers MD    Copy of this evaluation report is provided to requesting physician.    [place] [facility] Preop Guidelines    Revised Cardiac Risk Index

## 2020-06-15 NOTE — PATIENT INSTRUCTIONS
Before Your Surgery      Call your surgeon if there is any change in your health. This includes signs of a cold or flu (such as a sore throat, runny nose, cough, rash or fever).    Do not smoke, drink alcohol or take over the counter medicine (unless your surgeon or primary care doctor tells you to) for the 24 hours before and after surgery.    If you take prescribed drugs: Follow your doctor s orders about which medicines to take and which to stop until after surgery.    Eating and drinking prior to surgery: follow the instructions from your surgeon    Take a shower or bath the night before surgery. Use the soap your surgeon gave you to gently clean your skin. If you do not have soap from your surgeon, use your regular soap. Do not shave or scrub the surgery site.  Wear clean pajamas and have clean sheets on your bed.             Thank you for choosing [facility].  You may be receiving an email and/or telephone survey request from [facility] Customer Experience regarding your visit today.  Please take a few minutes to respond to the survey to let us know how we are doing.      If you have questions or concerns, please contact us via [facility] or you can contact your care team at [phone].    Our Clinic hours are:  Monday 6:40 am  to 7:00 pm  Tuesday -Friday 6:40 am to 5:00 pm    The [place] outpatient lab hours are:  Monday - Friday 6:10 am to 4:45 pm   7:00 am to 11:00 am  Appointments are required, call [phone]    If you have clinical questions after hours or would like to schedule an appointment,  call the clinic at [phone].    DIAGNOSTICS:     EKst degree AV block, normal axis, normal intervals, no acute ST/T changes c/w ischemia, no LVH by voltage criteria, Left Bundle Branch Block  Labs Drawn and in Process:   Unresulted Labs Ordered in the Past 30 Days of this Admission     No orders found from 2020 to 2020.          Recent Labs   Lab Test 20  1131  19  1026 18  1030   NA  --  137  --    POTASSIUM  --  3.8 3.9   CR 1.44* 1.66* 1.49*        IMPRESSION:   Reason for surgery/procedure: Herson Mondragon (: 1940) presents for pre-operative evaluation assessment as requested by Dr. Cyr.  He requires evaluation and anesthesia risk assessment prior to undergoing surgery/procedure for treatment of bilateral inguinal hernia.    (R31.9) Hematuria  Comment:   Plan: *UA reflex to Microscopic and Culture   Plan: CT Abdomen Pelvis w Contrast, UROLOGY ADULT         REFERRAL        See below.         The proposed surgical procedure is considered INTERMEDIATE risk.    REVISED CARDIAC RISK INDEX  The patient has the following serious cardiovascular risks for perioperative complications such as (MI, PE, VFib and 3  AV Block):      ICD-10-CM    1. Preop general physical exam  Z01.818        RECOMMENDATIONS:       --Patient is to take all scheduled medications on the day before surgery EXCEPT for modifications listed below.  Take no aspirin or advil or aleve for one week before surgery. Tylenol is OK.   Follow the Cardiology instructions for stopping Xarelto before surgery.     APPROVAL is pending to proceed with proposed procedure, with further diagnostic evaluation.  The CT of the abdomen is ordered, and call [phone] to schedule, or just go to Diagnostics.   The Urology referral is ordered, and call [phone] to schedule.   These will need to be done before clearing you.

## 2020-06-16 ENCOUNTER — HOSPITAL ENCOUNTER (OUTPATIENT)
Dept: CT IMAGING | Facility: CLINIC | Age: 80
Discharge: HOME | End: 2020-06-16
Attending: FAMILY MEDICINE | Admitting: FAMILY MEDICINE
Payer: COMMERCIAL

## 2020-06-16 DIAGNOSIS — Z11.59 ENCOUNTER FOR SCREENING FOR OTHER VIRAL DISEASES: ICD-10-CM

## 2020-06-16 DIAGNOSIS — N23 KIDNEY PAIN: ICD-10-CM

## 2020-06-16 LAB
SARS-COV-2 RNA SPEC QL NAA+PROBE: NOT DETECTED
SPECIMEN SOURCE: NORMAL

## 2020-06-16 PROCEDURE — 74178 CT ABD&PLV WO CNTR FLWD CNTR: CPT

## 2020-06-16 PROCEDURE — 25000125 ZZHC RX 250: Performed by: RADIOLOGY

## 2020-06-16 PROCEDURE — 99000 SPECIMEN HANDLING OFFICE-LAB: CPT | Performed by: SURGERY

## 2020-06-16 PROCEDURE — 25000128 H RX IP 250 OP 636: Performed by: RADIOLOGY

## 2020-06-16 PROCEDURE — U0003 INFECTIOUS AGENT DETECTION BY NUCLEIC ACID (DNA OR RNA); SEVERE ACUTE RESPIRATORY SYNDROME CORONAVIRUS 2 (SARS-COV-2) (CORONAVIRUS DISEASE [COVID-19]), AMPLIFIED PROBE TECHNIQUE, MAKING USE OF HIGH THROUGHPUT TECHNOLOGIES AS DESCRIBED BY CMS-2020-01-R: HCPCS | Mod: 90 | Performed by: SURGERY

## 2020-06-16 RX ORDER — IOPAMIDOL 755 MG/ML
89 INJECTION, SOLUTION INTRAVASCULAR ONCE
Status: COMPLETED | OUTPATIENT
Start: 2020-06-16 | End: 2020-06-16

## 2020-06-16 RX ADMIN — SODIUM CHLORIDE 62 ML: 9 INJECTION, SOLUTION INTRAVENOUS at 12:30

## 2020-06-16 RX ADMIN — IOPAMIDOL 89 ML: 755 INJECTION, SOLUTION INTRAVENOUS at 12:30

## 2020-06-17 ENCOUNTER — MYC MEDICAL ADVICE (OUTPATIENT)
Dept: FAMILY MEDICINE | Facility: CLINIC | Age: 80
End: 2020-06-17

## 2020-06-17 ENCOUNTER — ANESTHESIA EVENT (OUTPATIENT)
Dept: SURGERY | Facility: CLINIC | Age: 80
End: 2020-06-17

## 2020-06-18 ENCOUNTER — TELEPHONE (OUTPATIENT)
Dept: FAMILY MEDICINE | Facility: CLINIC | Age: 80
End: 2020-06-18

## 2020-06-18 RX ORDER — LIDOCAINE 40 MG/G
CREAM TOPICAL
Status: CANCELLED | OUTPATIENT
Start: 2020-06-18

## 2020-06-18 RX ORDER — ACETAMINOPHEN 325 MG/1
975 TABLET ORAL ONCE
Status: CANCELLED | OUTPATIENT
Start: 2020-06-18 | End: 2020-06-18

## 2020-06-18 RX ORDER — GABAPENTIN 300 MG/1
300 CAPSULE ORAL ONCE
Status: CANCELLED | OUTPATIENT
Start: 2020-06-18 | End: 2020-06-18

## 2020-06-18 RX ORDER — SODIUM CHLORIDE, SODIUM LACTATE, POTASSIUM CHLORIDE, CALCIUM CHLORIDE 600; 310; 30; 20 MG/100ML; MG/100ML; MG/100ML; MG/100ML
INJECTION, SOLUTION INTRAVENOUS CONTINUOUS
Status: CANCELLED | OUTPATIENT
Start: 2020-06-18

## 2020-06-18 ASSESSMENT — ENCOUNTER SYMPTOMS: DYSRHYTHMIAS: 1

## 2020-06-18 NOTE — TELEPHONE ENCOUNTER
He has stones in the bladder and kidney. These could account for the blood, but he has to contact Urology for evaluation before clearance for surgery is done.     Tiburcio Landers MD

## 2020-06-18 NOTE — TELEPHONE ENCOUNTER
Per telephone note yesterday:    He has stones in the bladder and kidney. These could account for the blood, but he has to contact Urology for evaluation before clearance for surgery is done.      Tiburcio Landers MD

## 2020-06-18 NOTE — TELEPHONE ENCOUNTER
Dr. Landers,    Patient sends my chart message asking if he is approved to have his hernia surgery on Friday.  Please advise. Lucy DENSON RN

## 2020-06-18 NOTE — TELEPHONE ENCOUNTER
Reason for Call:  Other appointment    Detailed comments: Eliana from surgery is calling asking for Tosteson to go into pre op and clear patient for surgery tomorrow Chart review number is [phone]    Phone Number Patient can be reached at: Home number on file [phone] (home)    Best Time: any    Can we leave a detailed message on this number? YES    Call taken on 6/18/2020 at 10:21 AM by Tigist Ibarra

## 2020-06-18 NOTE — TELEPHONE ENCOUNTER
See previous note. I cannot clear him til Urology evaluates him. Surgery will need to be rescheduled.   Tiburcio Landers MD

## 2020-06-18 NOTE — ANESTHESIA PREPROCEDURE EVALUATION
Anesthesia Pre-Procedure Evaluation    Patient: Herson Mondragon   MRN: [id number] : 1940          Preoperative Diagnosis: Bilateral inguinal hernia without obstruction or gangrene, recurrence not specified [K40.20]    Procedure(s):  Laparoscopic bilateral inguinal hernia repair    Past Medical History:   Diagnosis Date     Calculus of kidney      Degeneration of lumbar or lumbosacral intervertebral disc      Duodenal ulcer, unspecified as acute or chronic, without hemorrhage, perforation, or obstruction 1995     Other and unspecified hyperlipidemia      Systolic congestive heart failure, unspecified HF chronicity (H) 2019     Unspecified essential hypertension      Past Surgical History:   Procedure Laterality Date     EYE SURGERY  10/2018    Cataract surgery in [place]       Anesthesia Evaluation     . Pt has had prior anesthetic.            ROS/MED HX    ENT/Pulmonary:       Neurologic:       Cardiovascular:     (+) Dyslipidemia, hypertension--CAD, --. : . CHF etiology: Systolic . fainting (syncope). :. dysrhythmias (BBB) a-fib, 1st Deg Heart Block and Other, . Previous cardiac testing date:results:date: results:ECG reviewed date:6/15/2020 results:Sinus Rhythm -First degree A-V block -With rate variation   Karen = 310   cv = 14.  -Left bundle branch block and left axis.     ABNORMAL    date: results:          METS/Exercise Tolerance:     Hematologic:         Musculoskeletal:   (+) arthritis,  other musculoskeletal- DDD      GI/Hepatic:         Renal/Genitourinary:     (+) Nephrolithiasis ,       Endo:         Psychiatric:     (+) psychiatric history anxiety and depression      Infectious Disease:         Malignancy:         Other:                                 Lab Results   Component Value Date    WBC 7.4 06/15/2020    HGB 13.3 06/15/2020    HCT 40.6 06/15/2020     06/15/2020     2019    POTASSIUM 3.8 2019    CHLORIDE 103 2019    CO2 29 2019    BUN 26 2019  "   CR 1.39 (H) 06/15/2020    GLC 97 07/23/2019    ANNE 9.5 07/23/2019    ALBUMIN 4.5 03/19/2008    PROTTOTAL 7.5 03/19/2008    ALT 31 05/01/2020    AST 16 07/24/2017    ALKPHOS 110 03/19/2008    BILITOTAL 0.6 03/19/2008    INR 1.06 03/19/2008    TSH 0.89 03/19/2008    T4 1.14 03/19/2008       Preop Vitals  BP Readings from Last 3 Encounters:   06/15/20 (!) 158/80   06/03/20 (!) 143/85   05/18/20 (!) 156/88    Pulse Readings from Last 3 Encounters:   06/15/20 56   06/03/20 57   05/18/20 59      Resp Readings from Last 3 Encounters:   06/15/20 20   05/18/20 20   09/18/19 16    SpO2 Readings from Last 3 Encounters:   06/15/20 98%   05/18/20 97%   10/16/19 95%      Temp Readings from Last 1 Encounters:   06/15/20 36.6  C (97.8  F) (Tympanic)    Ht Readings from Last 1 Encounters:   06/15/20 1.746 m (5' 8.75\")      Wt Readings from Last 1 Encounters:   06/15/20 82.6 kg (182 lb)    Estimated body mass index is 27.07 kg/m  as calculated from the following:    Height as of 6/15/20: 1.746 m (5' 8.75\").    Weight as of 6/15/20: 82.6 kg (182 lb).                   Dafne Rodriguez, APRN CRNA  "

## 2020-06-18 NOTE — TELEPHONE ENCOUNTER
Left non-detailed message for patient to return a call to the clinic RN.      Pt has viewed the [employer] message advising he cannot proceed with surgery until he sees urology.    Dr Landers placed a referral to urology.  Pt needs to schedule appt.  [phone].    Kelsie Magallanes RN

## 2020-06-18 NOTE — TELEPHONE ENCOUNTER
Routed back to Dr Landers.  I do not see that pt has seen urology yet.  The CT urogram was ordered by you.  Place referral to urology?  Kelsie Magallanes RN

## 2020-06-19 ENCOUNTER — TELEPHONE (OUTPATIENT)
Dept: UROLOGY | Facility: CLINIC | Age: 80
End: 2020-06-19

## 2020-06-19 ENCOUNTER — ANESTHESIA (OUTPATIENT)
Dept: SURGERY | Facility: CLINIC | Age: 80
End: 2020-06-19

## 2020-06-19 DIAGNOSIS — Z01.810 PREOP CARDIOVASCULAR EXAM: Primary | ICD-10-CM

## 2020-06-19 NOTE — TELEPHONE ENCOUNTER
Reason for Call:  Other appointment    Detailed comments: pt wife Lashay calling stating pt was scheduled for hernia surgery today 6/19 but had to cancel due to having kidney stone and bladder stone. Also possible infection in urethra. Was told to get in for an appt w/ urology.     Phone Number Patient can be reached at: Home number on file [phone] (home) Lashay C2C on file     Best Time: any     Can we leave a detailed message on this number? YES    Call taken on 6/19/2020 at 8:53 AM by Becki Garcia

## 2020-06-19 NOTE — TELEPHONE ENCOUNTER
Wife called back.  She has been in contact this am with Dr Olivera's assistant.  They are coordinating a consultation with Dr Olivera.    Kelsie Magallanes RN

## 2020-06-23 NOTE — TELEPHONE ENCOUNTER
JUSTIN Olivera MD  You 4 minutes ago (12:14 PM)       Spoke with Dr. Cyr and suggested he move forward with the surgery. Also sent a note to Dr. Rm to get his opinion.

## 2020-06-23 NOTE — TELEPHONE ENCOUNTER
Dr Olivera; many stones are in Kidneys.  Would you like pt to see Jag?    Jeri BARON   [facility]

## 2020-06-23 NOTE — TELEPHONE ENCOUNTER
Pt wife calling  at 435pm stating she is looking to talk to someone regarding testing f/u.     Please call    Becki Garcia  [facility]

## 2020-06-24 ENCOUNTER — MYC MEDICAL ADVICE (OUTPATIENT)
Dept: FAMILY MEDICINE | Facility: CLINIC | Age: 80
End: 2020-06-24

## 2020-06-24 NOTE — TELEPHONE ENCOUNTER
Routed to Dr Landers.    Please see 6/19 telephone note from Dr Olivera.    Pt sent a [employer] message today asking if he can reschedule his hernia surgery?    Kelsie Magallanes RN

## 2020-06-24 NOTE — TELEPHONE ENCOUNTER
"Spoke to wife.  Advised.    Routing to Dr Cyr to ask if willing to sign off preop clearance after Dr Olivera opinion?  And to reorder the case request and Covid to proceed with surgery?     (they wish to check with Rachelle about future consult for \"In Kidney\" stone load)      Jeri BARON   [facility] RN  "

## 2020-06-24 NOTE — TELEPHONE ENCOUNTER
I addended the 6-15-20 H&P. He is OK for surgery.   Please notify the pt and surgeon, Dr. Cyr.   Tiburcio Landers MD

## 2020-06-26 DIAGNOSIS — K40.21 BILATERAL RECURRENT INGUINAL HERNIA WITHOUT OBSTRUCTION OR GANGRENE: Primary | ICD-10-CM

## 2020-06-26 NOTE — TELEPHONE ENCOUNTER
Pt's wife, Lashay, calling to schedule hernia surgery.     Please contact Lashay  @ [phone]     Denise Behrendt  [facility] CSS

## 2020-06-26 NOTE — TELEPHONE ENCOUNTER
Pt's surgery is scheduled for 07/07/2020.  Home preparations reviewed again.  Pt still has packet.  Informed that COVID test needs to be completed 48-72 hours prior to surgery. Per Dr. Cyr pt should be off his Xarelto 5 days prior to surgery (Dr. Cyr spoke with Lashay to discuss this).  No further questions at this time.      Kaylah RGEY, CMA

## 2020-07-03 ENCOUNTER — ANESTHESIA EVENT (OUTPATIENT)
Dept: SURGERY | Facility: CLINIC | Age: 80
End: 2020-07-03
Payer: COMMERCIAL

## 2020-07-03 ASSESSMENT — ENCOUNTER SYMPTOMS: DYSRHYTHMIAS: 1

## 2020-07-03 NOTE — ANESTHESIA PREPROCEDURE EVALUATION
Anesthesia Pre-Procedure Evaluation    Patient: Herson Mondragon   MRN: [id number] : 1940          Preoperative Diagnosis: Bilateral recurrent inguinal hernia without obstruction or gangrene [K40.21]    Procedure(s):  Laparoscopic bilateral inguinal hernia repair    Past Medical History:   Diagnosis Date     Calculus of kidney      Degeneration of lumbar or lumbosacral intervertebral disc      Duodenal ulcer, unspecified as acute or chronic, without hemorrhage, perforation, or obstruction 1995     Other and unspecified hyperlipidemia      Systolic congestive heart failure, unspecified HF chronicity (H) 2019     Unspecified essential hypertension      Past Surgical History:   Procedure Laterality Date     EYE SURGERY  10/2018    Cataract surgery in [place]       Anesthesia Evaluation     . Pt has had prior anesthetic. Type: General           ROS/MED HX    ENT/Pulmonary:     (+)other ENT- Resighini, , . .    Neurologic:  - neg neurologic ROS     Cardiovascular:     (+) Dyslipidemia, hypertension--CAD, -CABG-date: , . Taking blood thinners : . CHF . fainting (syncope). :. dysrhythmias a-fib, . Previous cardiac testing date:results:date: results:ECG reviewed date: results:Sinus Rhythm -First degree A-V block -With rate variation   Karen = 310   cv = 14.  -Left bundle branch block and left axis.     ABNORMAL date: results:          METS/Exercise Tolerance:  4 - Raking leaves, gardening   Hematologic:  - neg hematologic  ROS       Musculoskeletal:   (+) arthritis,  other musculoskeletal- lumbar DDD      GI/Hepatic:     (+) Other GI/Hepatic h/o duodenal ulcer      Renal/Genitourinary:     (+) Nephrolithiasis , BPH,       Endo:  - neg endo ROS       Psychiatric:     (+) psychiatric history anxiety and depression      Infectious Disease:  - neg infectious disease ROS       Malignancy:      - no malignancy   Other:    - neg other ROS                      Physical Exam  Normal systems: cardiovascular and  "pulmonary    Airway   Mallampati: II  TM distance: >3 FB  Neck ROM: full    Dental   (+) missing and other  Comment: broken    Cardiovascular       Pulmonary             Lab Results   Component Value Date    WBC 7.4 06/15/2020    HGB 13.3 06/15/2020    HCT 40.6 06/15/2020     06/15/2020     07/23/2019    POTASSIUM 3.8 07/23/2019    CHLORIDE 103 07/23/2019    CO2 29 07/23/2019    BUN 26 07/23/2019    CR 1.39 (H) 06/15/2020    GLC 97 07/23/2019    ANNE 9.5 07/23/2019    ALBUMIN 4.5 03/19/2008    PROTTOTAL 7.5 03/19/2008    ALT 31 05/01/2020    AST 16 07/24/2017    ALKPHOS 110 03/19/2008    BILITOTAL 0.6 03/19/2008    INR 1.06 03/19/2008    TSH 0.89 03/19/2008    T4 1.14 03/19/2008       Preop Vitals  BP Readings from Last 3 Encounters:   06/15/20 (!) 158/80   06/03/20 (!) 143/85   05/18/20 (!) 156/88    Pulse Readings from Last 3 Encounters:   06/15/20 56   06/03/20 57   05/18/20 59      Resp Readings from Last 3 Encounters:   06/15/20 20   05/18/20 20   09/18/19 16    SpO2 Readings from Last 3 Encounters:   06/15/20 98%   05/18/20 97%   10/16/19 95%      Temp Readings from Last 1 Encounters:   06/15/20 36.6  C (97.8  F) (Tympanic)    Ht Readings from Last 1 Encounters:   06/15/20 1.746 m (5' 8.75\")      Wt Readings from Last 1 Encounters:   06/15/20 82.6 kg (182 lb)    Estimated body mass index is 27.07 kg/m  as calculated from the following:    Height as of 6/15/20: 1.746 m (5' 8.75\").    Weight as of 6/15/20: 82.6 kg (182 lb).       Anesthesia Plan      History & Physical Review  History and physical reviewed and following examination; no interval change.    ASA Status:  3 .    NPO Status:  > 6 hours    Plan for General with Intravenous induction. Maintenance will be Balanced.    PONV prophylaxis:  Ondansetron (or other 5HT-3) and Dexamethasone or Solumedrol         Postoperative Care  Postoperative pain management:  IV analgesics and Oral pain medications.      Consents  Anesthetic plan, risks, " benefits and alternatives discussed with:  Patient and Spouse..                 CANDICE Burks CRNA

## 2020-07-06 DIAGNOSIS — Z20.822 ENCOUNTER FOR LABORATORY TESTING FOR COVID-19 VIRUS: Primary | ICD-10-CM

## 2020-07-06 PROCEDURE — 99207 ZZC NO CHARGE NURSE ONLY: CPT

## 2020-07-07 ENCOUNTER — ANESTHESIA (OUTPATIENT)
Dept: SURGERY | Facility: CLINIC | Age: 80
End: 2020-07-07
Payer: COMMERCIAL

## 2020-07-07 ENCOUNTER — HOSPITAL ENCOUNTER (OUTPATIENT)
Facility: CLINIC | Age: 80
Discharge: HOME | End: 2020-07-07
Attending: SURGERY | Admitting: SURGERY
Payer: COMMERCIAL

## 2020-07-07 VITALS
OXYGEN SATURATION: 95 % | BODY MASS INDEX: 26.48 KG/M2 | WEIGHT: 185 LBS | RESPIRATION RATE: 14 BRPM | TEMPERATURE: 98.2 F | HEART RATE: 91 BPM | HEIGHT: 70 IN | DIASTOLIC BLOOD PRESSURE: 91 MMHG | SYSTOLIC BLOOD PRESSURE: 137 MMHG

## 2020-07-07 DIAGNOSIS — K40.21 BILATERAL RECURRENT INGUINAL HERNIA WITHOUT OBSTRUCTION OR GANGRENE: ICD-10-CM

## 2020-07-07 DIAGNOSIS — G89.18 POSTOPERATIVE PAIN: Primary | ICD-10-CM

## 2020-07-07 PROCEDURE — 25000132 ZZH RX MED GY IP 250 OP 250 PS 637: Performed by: NURSE ANESTHETIST, CERTIFIED REGISTERED

## 2020-07-07 PROCEDURE — 71000015 ZZH RECOVERY PHASE 1 LEVEL 2 EA ADDTL HR: Performed by: SURGERY

## 2020-07-07 PROCEDURE — 25800030 ZZH RX IP 258 OP 636: Performed by: NURSE ANESTHETIST, CERTIFIED REGISTERED

## 2020-07-07 PROCEDURE — 49650 LAP ING HERNIA REPAIR INIT: CPT | Mod: 50 | Performed by: SURGERY

## 2020-07-07 PROCEDURE — 25000128 H RX IP 250 OP 636: Performed by: NURSE ANESTHETIST, CERTIFIED REGISTERED

## 2020-07-07 PROCEDURE — 71000014 ZZH RECOVERY PHASE 1 LEVEL 2 FIRST HR: Performed by: SURGERY

## 2020-07-07 PROCEDURE — 36000058 ZZH SURGERY LEVEL 3 EA 15 ADDTL MIN: Performed by: SURGERY

## 2020-07-07 PROCEDURE — 40000305 ZZH STATISTIC PRE PROC ASSESS I: Performed by: SURGERY

## 2020-07-07 PROCEDURE — 49650 LAP ING HERNIA REPAIR INIT: CPT | Mod: 50 | Performed by: PHYSICIAN ASSISTANT

## 2020-07-07 PROCEDURE — 36000056 ZZH SURGERY LEVEL 3 1ST 30 MIN: Performed by: SURGERY

## 2020-07-07 PROCEDURE — C1781 MESH (IMPLANTABLE): HCPCS | Performed by: SURGERY

## 2020-07-07 PROCEDURE — 25000125 ZZHC RX 250: Performed by: SURGERY

## 2020-07-07 PROCEDURE — 27110028 ZZH OR GENERAL SUPPLY NON-STERILE: Performed by: SURGERY

## 2020-07-07 PROCEDURE — 37000008 ZZH ANESTHESIA TECHNICAL FEE, 1ST 30 MIN: Performed by: SURGERY

## 2020-07-07 PROCEDURE — 25000125 ZZHC RX 250: Performed by: NURSE ANESTHETIST, CERTIFIED REGISTERED

## 2020-07-07 PROCEDURE — 25000128 H RX IP 250 OP 636: Performed by: SURGERY

## 2020-07-07 PROCEDURE — 25000566 ZZH SEVOFLURANE, EA 15 MIN: Performed by: SURGERY

## 2020-07-07 PROCEDURE — 27210794 ZZH OR GENERAL SUPPLY STERILE: Performed by: SURGERY

## 2020-07-07 PROCEDURE — 37000009 ZZH ANESTHESIA TECHNICAL FEE, EACH ADDTL 15 MIN: Performed by: SURGERY

## 2020-07-07 PROCEDURE — 71000027 ZZH RECOVERY PHASE 2 EACH 15 MINS: Performed by: SURGERY

## 2020-07-07 PROCEDURE — 25000132 ZZH RX MED GY IP 250 OP 250 PS 637: Performed by: SURGERY

## 2020-07-07 DEVICE — MESH PROGRIP LAPAROSCOPIC 5.9X3.9" PARIETEX SELF-FIX LPG1510: Type: IMPLANTABLE DEVICE | Site: GROIN | Status: FUNCTIONAL

## 2020-07-07 RX ORDER — SODIUM CHLORIDE, SODIUM LACTATE, POTASSIUM CHLORIDE, CALCIUM CHLORIDE 600; 310; 30; 20 MG/100ML; MG/100ML; MG/100ML; MG/100ML
INJECTION, SOLUTION INTRAVENOUS CONTINUOUS
Status: DISCONTINUED | OUTPATIENT
Start: 2020-07-07 | End: 2020-07-07 | Stop reason: HOSPADM

## 2020-07-07 RX ORDER — MEPERIDINE HYDROCHLORIDE 25 MG/ML
12.5 INJECTION INTRAMUSCULAR; INTRAVENOUS; SUBCUTANEOUS
Status: DISCONTINUED | OUTPATIENT
Start: 2020-07-07 | End: 2020-07-07 | Stop reason: HOSPADM

## 2020-07-07 RX ORDER — HYDRALAZINE HYDROCHLORIDE 20 MG/ML
10 INJECTION INTRAMUSCULAR; INTRAVENOUS ONCE
Status: COMPLETED | OUTPATIENT
Start: 2020-07-07 | End: 2020-07-07

## 2020-07-07 RX ORDER — DIMENHYDRINATE 50 MG/ML
25 INJECTION, SOLUTION INTRAMUSCULAR; INTRAVENOUS
Status: DISCONTINUED | OUTPATIENT
Start: 2020-07-07 | End: 2020-07-07 | Stop reason: HOSPADM

## 2020-07-07 RX ORDER — METOCLOPRAMIDE 10 MG/1
10 TABLET ORAL EVERY 6 HOURS PRN
Status: DISCONTINUED | OUTPATIENT
Start: 2020-07-07 | End: 2020-07-07 | Stop reason: HOSPADM

## 2020-07-07 RX ORDER — METOCLOPRAMIDE HYDROCHLORIDE 5 MG/ML
10 INJECTION INTRAMUSCULAR; INTRAVENOUS EVERY 6 HOURS PRN
Status: DISCONTINUED | OUTPATIENT
Start: 2020-07-07 | End: 2020-07-07 | Stop reason: HOSPADM

## 2020-07-07 RX ORDER — NALOXONE HYDROCHLORIDE 0.4 MG/ML
.1-.4 INJECTION, SOLUTION INTRAMUSCULAR; INTRAVENOUS; SUBCUTANEOUS
Status: DISCONTINUED | OUTPATIENT
Start: 2020-07-07 | End: 2020-07-07 | Stop reason: HOSPADM

## 2020-07-07 RX ORDER — PROPOFOL 10 MG/ML
INJECTION, EMULSION INTRAVENOUS PRN
Status: DISCONTINUED | OUTPATIENT
Start: 2020-07-07 | End: 2020-07-07

## 2020-07-07 RX ORDER — CEFAZOLIN SODIUM 1 G/50ML
1 INJECTION, SOLUTION INTRAVENOUS SEE ADMIN INSTRUCTIONS
Status: DISCONTINUED | OUTPATIENT
Start: 2020-07-07 | End: 2020-07-07 | Stop reason: HOSPADM

## 2020-07-07 RX ORDER — GLYCOPYRROLATE 0.2 MG/ML
INJECTION, SOLUTION INTRAMUSCULAR; INTRAVENOUS PRN
Status: DISCONTINUED | OUTPATIENT
Start: 2020-07-07 | End: 2020-07-07

## 2020-07-07 RX ORDER — ONDANSETRON 2 MG/ML
4 INJECTION INTRAMUSCULAR; INTRAVENOUS EVERY 30 MIN PRN
Status: DISCONTINUED | OUTPATIENT
Start: 2020-07-07 | End: 2020-07-07 | Stop reason: HOSPADM

## 2020-07-07 RX ORDER — ONDANSETRON 4 MG/1
4 TABLET, ORALLY DISINTEGRATING ORAL EVERY 30 MIN PRN
Status: DISCONTINUED | OUTPATIENT
Start: 2020-07-07 | End: 2020-07-07 | Stop reason: HOSPADM

## 2020-07-07 RX ORDER — FENTANYL CITRATE 50 UG/ML
INJECTION, SOLUTION INTRAMUSCULAR; INTRAVENOUS PRN
Status: DISCONTINUED | OUTPATIENT
Start: 2020-07-07 | End: 2020-07-07

## 2020-07-07 RX ORDER — HYDROCODONE BITARTRATE AND ACETAMINOPHEN 5; 325 MG/1; MG/1
1-2 TABLET ORAL EVERY 4 HOURS PRN
Status: DISCONTINUED | OUTPATIENT
Start: 2020-07-07 | End: 2020-07-07 | Stop reason: HOSPADM

## 2020-07-07 RX ORDER — FENTANYL CITRATE 50 UG/ML
25-50 INJECTION, SOLUTION INTRAMUSCULAR; INTRAVENOUS
Status: DISCONTINUED | OUTPATIENT
Start: 2020-07-07 | End: 2020-07-07 | Stop reason: HOSPADM

## 2020-07-07 RX ORDER — ALBUTEROL SULFATE 0.83 MG/ML
2.5 SOLUTION RESPIRATORY (INHALATION) EVERY 4 HOURS PRN
Status: DISCONTINUED | OUTPATIENT
Start: 2020-07-07 | End: 2020-07-07 | Stop reason: HOSPADM

## 2020-07-07 RX ORDER — CEFAZOLIN SODIUM 2 G/100ML
2 INJECTION, SOLUTION INTRAVENOUS
Status: COMPLETED | OUTPATIENT
Start: 2020-07-07 | End: 2020-07-07

## 2020-07-07 RX ORDER — HYDRALAZINE HYDROCHLORIDE 20 MG/ML
5 INJECTION INTRAMUSCULAR; INTRAVENOUS EVERY 6 HOURS PRN
Status: DISCONTINUED | OUTPATIENT
Start: 2020-07-07 | End: 2020-07-07 | Stop reason: HOSPADM

## 2020-07-07 RX ORDER — ONDANSETRON 2 MG/ML
INJECTION INTRAMUSCULAR; INTRAVENOUS PRN
Status: DISCONTINUED | OUTPATIENT
Start: 2020-07-07 | End: 2020-07-07

## 2020-07-07 RX ORDER — BUPIVACAINE HYDROCHLORIDE AND EPINEPHRINE 5; 5 MG/ML; UG/ML
INJECTION, SOLUTION PERINEURAL PRN
Status: DISCONTINUED | OUTPATIENT
Start: 2020-07-07 | End: 2020-07-07 | Stop reason: HOSPADM

## 2020-07-07 RX ORDER — DEXAMETHASONE SODIUM PHOSPHATE 4 MG/ML
INJECTION, SOLUTION INTRA-ARTICULAR; INTRALESIONAL; INTRAMUSCULAR; INTRAVENOUS; SOFT TISSUE PRN
Status: DISCONTINUED | OUTPATIENT
Start: 2020-07-07 | End: 2020-07-07

## 2020-07-07 RX ORDER — ACETAMINOPHEN 325 MG/1
975 TABLET ORAL ONCE
Status: COMPLETED | OUTPATIENT
Start: 2020-07-07 | End: 2020-07-07

## 2020-07-07 RX ORDER — LIDOCAINE HYDROCHLORIDE 10 MG/ML
INJECTION, SOLUTION INFILTRATION; PERINEURAL PRN
Status: DISCONTINUED | OUTPATIENT
Start: 2020-07-07 | End: 2020-07-07

## 2020-07-07 RX ORDER — HYDROCODONE BITARTRATE AND ACETAMINOPHEN 5; 325 MG/1; MG/1
1-2 TABLET ORAL EVERY 6 HOURS PRN
Qty: 20 TABLET | Refills: 0 | Status: SHIPPED | OUTPATIENT
Start: 2020-07-07 | End: 2021-10-06

## 2020-07-07 RX ORDER — LIDOCAINE 40 MG/G
CREAM TOPICAL
Status: DISCONTINUED | OUTPATIENT
Start: 2020-07-07 | End: 2020-07-07 | Stop reason: HOSPADM

## 2020-07-07 RX ADMIN — GLYCOPYRROLATE 0.1 MG: 0.2 INJECTION, SOLUTION INTRAMUSCULAR; INTRAVENOUS at 13:20

## 2020-07-07 RX ADMIN — MIDAZOLAM 1 MG: 1 INJECTION INTRAMUSCULAR; INTRAVENOUS at 13:20

## 2020-07-07 RX ADMIN — HYDROCODONE BITARTRATE AND ACETAMINOPHEN 1 TABLET: 5; 325 TABLET ORAL at 17:40

## 2020-07-07 RX ADMIN — SODIUM CHLORIDE, POTASSIUM CHLORIDE, SODIUM LACTATE AND CALCIUM CHLORIDE: 600; 310; 30; 20 INJECTION, SOLUTION INTRAVENOUS at 12:40

## 2020-07-07 RX ADMIN — LIDOCAINE HYDROCHLORIDE 100 MG: 10 INJECTION, SOLUTION INFILTRATION; PERINEURAL at 13:23

## 2020-07-07 RX ADMIN — PROPOFOL 150 MG: 10 INJECTION, EMULSION INTRAVENOUS at 13:23

## 2020-07-07 RX ADMIN — MIDAZOLAM 1 MG: 1 INJECTION INTRAMUSCULAR; INTRAVENOUS at 13:22

## 2020-07-07 RX ADMIN — FENTANYL CITRATE 150 MCG: 50 INJECTION, SOLUTION INTRAMUSCULAR; INTRAVENOUS at 13:33

## 2020-07-07 RX ADMIN — LIDOCAINE HYDROCHLORIDE 1 ML: 10 INJECTION, SOLUTION EPIDURAL; INFILTRATION; INTRACAUDAL; PERINEURAL at 12:40

## 2020-07-07 RX ADMIN — ONDANSETRON 4 MG: 2 INJECTION INTRAMUSCULAR; INTRAVENOUS at 14:23

## 2020-07-07 RX ADMIN — ACETAMINOPHEN 975 MG: 325 TABLET, FILM COATED ORAL at 12:39

## 2020-07-07 RX ADMIN — FENTANYL CITRATE 100 MCG: 50 INJECTION, SOLUTION INTRAMUSCULAR; INTRAVENOUS at 13:23

## 2020-07-07 RX ADMIN — SODIUM CHLORIDE, POTASSIUM CHLORIDE, SODIUM LACTATE AND CALCIUM CHLORIDE: 600; 310; 30; 20 INJECTION, SOLUTION INTRAVENOUS at 15:07

## 2020-07-07 RX ADMIN — HYDRALAZINE HYDROCHLORIDE 10 MG: 20 INJECTION INTRAMUSCULAR; INTRAVENOUS at 16:40

## 2020-07-07 RX ADMIN — HYDRALAZINE HYDROCHLORIDE 5 MG: 20 INJECTION INTRAMUSCULAR; INTRAVENOUS at 16:18

## 2020-07-07 RX ADMIN — CEFAZOLIN SODIUM 2 G: 2 INJECTION, SOLUTION INTRAVENOUS at 13:20

## 2020-07-07 RX ADMIN — DEXAMETHASONE SODIUM PHOSPHATE 4 MG: 4 INJECTION, SOLUTION INTRA-ARTICULAR; INTRALESIONAL; INTRAMUSCULAR; INTRAVENOUS; SOFT TISSUE at 13:23

## 2020-07-07 RX ADMIN — HYDROMORPHONE HYDROCHLORIDE 0.5 MG: 1 INJECTION, SOLUTION INTRAMUSCULAR; INTRAVENOUS; SUBCUTANEOUS at 15:49

## 2020-07-07 RX ADMIN — GLYCOPYRROLATE 0.1 MG: 0.2 INJECTION, SOLUTION INTRAMUSCULAR; INTRAVENOUS at 13:23

## 2020-07-07 ASSESSMENT — MIFFLIN-ST. JEOR: SCORE: 1550.4

## 2020-07-07 NOTE — ANESTHESIA POSTPROCEDURE EVALUATION
Patient: Herson Mondragon    Procedure(s):  Laparoscopic bilateral inguinal hernia repair    Diagnosis:Bilateral recurrent inguinal hernia without obstruction or gangrene [K40.21]  Diagnosis Additional Information: No value filed.    Anesthesia Type:  General    Note:  Anesthesia Post Evaluation    Patient location during evaluation: Phase 2  Patient participation: Able to fully participate in evaluation  Level of consciousness: awake and alert  Pain management: adequate  Airway patency: patent  Cardiovascular status: acceptable  Respiratory status: acceptable  Hydration status: acceptable  PONV: none     Anesthetic complications: None          Last vitals:  Vitals:    07/07/20 1615 07/07/20 1700 07/07/20 1737   BP: (!) 159/109 (!) 157/79 (!) 137/91   Pulse: 85 79 91   Resp: 16 16 14   Temp:      SpO2: 95% 97% 95%         Electronically Signed By: CANDICE Amador CRNA  July 7, 2020  5:39 PM

## 2020-07-07 NOTE — OP NOTE
Preop diagnosis: Bilateral inguinal hernias    Postop diagnosis: Same    Procedure: Laparoscopic bilateral inguinal hernia repair    Surgeon: Ger    Assistant surgeon: Emil Caldwell PA-C (needed for expertise in camera operation retraction hemostasis wound closure and suctioning)    Anesthesia: General endotracheal Ruzek CRNA    Procedure: Patient's lower abdomen was clipped of extraneous hair and cleaned and draped in a sterile manner.  1/4% Marcaine with epinephrine was used to anesthetize all port sites.  Small subumbilical curvilinear incision made and subcutaneous tissues dissected to fascia.  Anterior fascia of right rectus muscle open revealing muscle beneath.  Muscle pulled laterally revealing posterior fascia.  Dissecting balloon trocar inserted into preperitoneal space and insufflated under direct vision.  This was removed and 10 mm trocar placed into preperitoneal space.  Carbon dioxide insufflated to 15 mmHg.  Under direct vision, 2 midline 5 mm trochars also placed.  Patient placed into Trendelenburg position.  Attention was turned to the midline.  The loose areolar tissue covering the pubic arch was  revealing the bony structure.  This dissection continued laterally to the right pelvic sidewall musculature and then down to the psoas muscle.  The hernia sac which was indirect, was quite large and  from the cord structures.  It was also removed from the inguinal canal and swept to the bottom of the operative field.  Inspection of the canal revealed no evidence of cord lipomas.  A piece of Covidien pro- mesh was then used for repair.  It was unrolled from superior to inferior completely covering Hesselbach's triangle and the indirect defect and crossing slightly at midline.  The inferior margin the mesh tucked easily under the parietal peritoneum.  Attention was then turned to the left side in a similar fashion the left side had a large indirect inguinal hernia.  The sac was removed  from the inguinal canal and  from the cord structures and swept to the bottom of the operative field.  A second piece of pro- mesh was used for repair.  It was unrolled from superior to inferior covering the indirect defect Hesselbach's triangle and overlapping the right-sided mesh slightly at midline.  The inferior margin the mesh tucked easily under the parietal peritoneum.  All trochars were then removed under direct vision and the air allowed to desufflate.  The fascial defect of the subumbilical port was closed using an 0 Vicryl suture in a figure-of-eight fashion and this was bolstered with a second 0 Vicryl on top of that and reinjected with Marcaine.  All wounds were irrigated with normal saline and the skin closed using 4-0 Vicryl running subcuticular stitches and dressed with Dermabond.    Estimated blood loss: 5 mL    IV fluid: 800 mL

## 2020-07-07 NOTE — DISCHARGE INSTRUCTIONS
DISCHARGE INSTRUCTIONS     1. You may resume your regular diet when you feel you are ready    2. Do not restart Xarleto until Saturday, July 11,2020    3. You will have some discomfort at the incision sites. This is expected. This should improve over the next 2-3 days. Ice and pain medication will help with this pain. Use prescribed pain medication as instructed.     4. Some bruising and mild swelling is normal after surgery. The area below and around the incision(s) may be hard and elevated. This is part of the normal healing process. This will resolve slowly over the next several months.     5. Your wounds are covered with glue. The glue is water tight and so you can shower or bathe immediately following surgery.     6. Use the following medications (in addition to your normal meds) as shown:      Tylenol (acetaminophen) 500 mg every 6 hours as needed for pain.    Do not take more than 1000 mg of Tylenol every 6 hours -OR- 4g in a day    Motrin (ibuprophen) 600 mg every 6 hours as needed for pain. Take with food.     8. Notify Clinic at [phone] if:     Your discomfort is not relieved by your pain medication     You have signs of infection such as temperature above 100.4 degrees orally,  chills, or increasing daily discomfort.     Incision site is becoming more red and/or there is purulent drainage.      9. Follow up with Dr Cyr in 1-2 weeks.

## 2020-07-07 NOTE — ANESTHESIA CARE TRANSFER NOTE
Patient: Herson Mondragon    Procedure(s):  Laparoscopic bilateral inguinal hernia repair    Diagnosis: Bilateral recurrent inguinal hernia without obstruction or gangrene [K40.21]  Diagnosis Additional Information: No value filed.    Anesthesia Type:   General     Note:  Airway :Face Mask  Patient transferred to:PACU  Handoff Report: Identifed the Patient, Identified the Reponsible Provider, Reviewed the pertinent medical history, Discussed the surgical course, Reviewed Intra-OP anesthesia mangement and issues during anesthesia, Set expectations for post-procedure period and Allowed opportunity for questions and acknowledgement of understanding      Vitals: (Last set prior to Anesthesia Care Transfer)    CRNA VITALS  7/7/2020 1402 - 7/7/2020 1439      7/7/2020             Pulse:  76    SpO2:  97 %    Resp Rate (observed):  (!) 6                Electronically Signed By: CANDICE Dior CRNA  July 7, 2020  2:39 PM

## 2020-07-17 ENCOUNTER — OFFICE VISIT (OUTPATIENT)
Dept: SURGERY | Facility: CLINIC | Age: 80
End: 2020-07-17
Payer: COMMERCIAL

## 2020-07-17 VITALS
SYSTOLIC BLOOD PRESSURE: 148 MMHG | RESPIRATION RATE: 16 BRPM | OXYGEN SATURATION: 97 % | TEMPERATURE: 97.8 F | HEART RATE: 76 BPM | DIASTOLIC BLOOD PRESSURE: 83 MMHG

## 2020-07-17 DIAGNOSIS — Z09 POSTOP CHECK: ICD-10-CM

## 2020-07-17 DIAGNOSIS — G89.18 POSTOPERATIVE PAIN: Primary | ICD-10-CM

## 2020-07-17 PROCEDURE — 99024 POSTOP FOLLOW-UP VISIT: CPT | Performed by: SURGERY

## 2020-07-17 RX ORDER — HYDROCODONE BITARTRATE AND ACETAMINOPHEN 5; 325 MG/1; MG/1
1-2 TABLET ORAL EVERY 6 HOURS PRN
Qty: 20 TABLET | Refills: 0 | Status: SHIPPED | OUTPATIENT
Start: 2020-07-17 | End: 2021-10-06

## 2020-07-17 NOTE — PROGRESS NOTES
No complaints.  Pain controlled with oral pain meds.    BP (!) 148/83 (BP Location: Right arm, Patient Position: Sitting, Cuff Size: Adult Regular)   Pulse 76   Temp 97.8  F (36.6  C) (Tympanic)   Resp 16   SpO2 97%     Exam  [id number]  CTAB  RRR  S&NTND+BS, wounds - cdi s erythema  No CCE    A/P s/p lap BIH healing well. RTC prn.    Al Cyr MD

## 2020-07-17 NOTE — LETTER
7/17/2020         RE: Herson Mondragon  [address]  [facility] [address]        Dear Colleague,    Thank you for referring your patient, Herson Mondragon, to the [facility]. Please see a copy of my visit note below.    No complaints.  Pain controlled with oral pain meds.    BP (!) 148/83 (BP Location: Right arm, Patient Position: Sitting, Cuff Size: Adult Regular)   Pulse 76   Temp 97.8  F (36.6  C) (Tympanic)   Resp 16   SpO2 97%     Exam  [id number]  CTAB  RRR  S&NTND+BS, wounds - cdi s erythema  No CCE    A/P s/p lap BIH healing well. RTC prn.    Al Cyr MD     Again, thank you for allowing me to participate in the care of your patient.        Sincerely,        Al Cyr MD

## 2020-07-17 NOTE — NURSING NOTE
"Initial BP (!) 148/83 (BP Location: Right arm, Patient Position: Sitting, Cuff Size: Adult Regular)   Pulse 76   Temp 97.8  F (36.6  C) (Tympanic)   Resp 16   SpO2 97%  Estimated body mass index is 26.54 kg/m  as calculated from the following:    Height as of 7/7/20: 1.778 m (5' 10\").    Weight as of 7/7/20: 83.9 kg (185 lb). .    Jannet Bravo, [facility]    "

## 2020-11-01 ENCOUNTER — MYC MEDICAL ADVICE (OUTPATIENT)
Dept: FAMILY MEDICINE | Facility: CLINIC | Age: 80
End: 2020-11-01

## 2020-11-01 DIAGNOSIS — I25.10 CORONARY ARTERY DISEASE INVOLVING NATIVE HEART WITHOUT ANGINA PECTORIS, UNSPECIFIED VESSEL OR LESION TYPE: ICD-10-CM

## 2020-11-01 DIAGNOSIS — I10 ESSENTIAL HYPERTENSION: ICD-10-CM

## 2020-11-02 RX ORDER — ROSUVASTATIN CALCIUM 20 MG/1
20 TABLET, COATED ORAL AT BEDTIME
Qty: 90 TABLET | Refills: 1 | Status: SHIPPED | OUTPATIENT
Start: 2020-11-02 | End: 2021-05-06

## 2020-11-02 RX ORDER — FUROSEMIDE 20 MG
20 TABLET ORAL DAILY
Qty: 90 TABLET | Refills: 3 | Status: CANCELLED | OUTPATIENT
Start: 2020-11-02

## 2020-11-02 NOTE — TELEPHONE ENCOUNTER
Dr. Landers,    Patient sends a my chart message asking for refills.  I have refilled the cholesterol medication per protocol.  However, he would like his lasix refilled and his creatinine is elevated.  Please advise. Lucy DENSON RN

## 2020-11-02 NOTE — TELEPHONE ENCOUNTER
"Requested Prescriptions   Pending Prescriptions Disp Refills     furosemide (LASIX) 20 MG tablet 90 tablet 3     Sig: Take 1 tablet (20 mg) by mouth daily       Diuretics (Including Combos) Protocol Failed - 11/2/2020  7:18 AM        Failed - Blood pressure under 140/90 in past 12 months     BP Readings from Last 3 Encounters:   07/17/20 (!) 148/83   07/07/20 (!) 137/91   06/15/20 (!) 158/80                 Failed - Normal serum creatinine on file in past 12 months     Recent Labs   Lab Test 06/15/20  1501   CR 1.39*              Failed - Normal serum potassium on file in past 12 months     Recent Labs   Lab Test 07/23/19  1026   POTASSIUM 3.8                    Failed - Normal serum sodium on file in past 12 months     Recent Labs   Lab Test 07/23/19  1026                 Passed - Recent (12 mo) or future (30 days) visit within the authorizing provider's specialty     Patient has had an office visit with the authorizing provider or a provider within the authorizing providers department within the previous 12 mos or has a future within next 30 days. See \"Patient Info\" tab in inbasket, or \"Choose Columns\" in Meds & Orders section of the refill encounter.              Passed - Medication is active on med list        Passed - Patient is age 18 or older           rosuvastatin (CRESTOR) 20 MG tablet 90 tablet 3     Sig: Take 1 tablet (20 mg) by mouth At Bedtime       Statins Protocol Passed - 11/2/2020  7:18 AM        Passed - LDL on file in past 12 months     Recent Labs   Lab Test 05/01/20  1137   LDL 32             Passed - No abnormal creatine kinase in past 12 months     No lab results found.             Passed - Recent (12 mo) or future (30 days) visit within the authorizing provider's specialty     Patient has had an office visit with the authorizing provider or a provider within the authorizing providers department within the previous 12 mos or has a future within next 30 days. See \"Patient Info\" tab in " "inbasket, or \"Choose Columns\" in Meds & Orders section of the refill encounter.              Passed - Medication is active on med list        Passed - Patient is age 18 or older             "

## 2020-11-04 ENCOUNTER — VIRTUAL VISIT (OUTPATIENT)
Dept: FAMILY MEDICINE | Facility: CLINIC | Age: 80
End: 2020-11-04
Payer: COMMERCIAL

## 2020-11-04 DIAGNOSIS — I10 ESSENTIAL HYPERTENSION: Primary | ICD-10-CM

## 2020-11-04 DIAGNOSIS — R31.0 GROSS HEMATURIA: ICD-10-CM

## 2020-11-04 PROCEDURE — 99214 OFFICE O/P EST MOD 30 MIN: CPT | Mod: 95 | Performed by: FAMILY MEDICINE

## 2020-11-04 NOTE — PROGRESS NOTES
"Herson Mondragon is a 80 year old male who is being evaluated via a billable telephone visit.      The patient has been notified of following:     \"This telephone visit will be conducted via a call between you and your physician/provider. We have found that certain health care needs can be provided without the need for a physical exam.  This service lets us provide the care you need with a short phone conversation.  If a prescription is necessary we can send it directly to your pharmacy.  If lab work is needed we can place an order for that and you can then stop by our lab to have the test done at a later time.    Telephone visits are billed at different rates depending on your insurance coverage. During this emergency period, for some insurers they may be billed the same as an in-person visit.  Please reach out to your insurance provider with any questions.    If during the course of the call the physician/provider feels a telephone visit is not appropriate, you will not be charged for this service.\"    Patient has given verbal consent for Telephone visit?  Yes    What phone number would you like to be contacted at? [phone]    How would you like to obtain your AVS? Tracy Klein     Herson Mondragon is a 80 year old male who presents via phone visit today for the following health issues:    HPI     Hypertension Follow-up      Do you check your blood pressure regularly outside of the clinic? Yes, checking almost daily    Are you following a low salt diet? Yes    Are your blood pressures ever more than 140 on the top number (systolic) OR more   than 90 on the bottom number (diastolic), for example 140/90? No     He has had gross hematuria recently.       Current Outpatient Medications   Medication Instructions     acetaminophen (TYLENOL) 1,000 mg, Oral, 3 TIMES DAILY     aspirin 81 mg, Oral, DAILY     carvedilol (COREG) 12.5 mg, Oral, 2 TIMES DAILY     furosemide (LASIX) 20 mg, Oral, DAILY     " HYDROcodone-acetaminophen (NORCO) 5-325 MG tablet 1-2 tablets, Oral, EVERY 6 HOURS PRN     HYDROcodone-acetaminophen (NORCO) 5-325 MG tablet 1-2 tablets, Oral, EVERY 6 HOURS PRN     Multiple Vitamins-Minerals (ONE DAILY MENS) TABS 1 tablet, DAILY     NEW MED Probiotic daily.     rivaroxaban ANTICOAGULANT (XARELTO ANTICOAGULANT) 20 mg, Oral, DAILY WITH SUPPER     rosuvastatin (CRESTOR) 20 mg, Oral, AT BEDTIME     tamsulosin (FLOMAX) 0.4 mg, Oral, DAILY       Patient Active Problem List   Diagnosis     Essential hypertension     Syncope and collapse     Atrial fibrillation (H)     Hyperlipidemia LDL goal <130     Generalized anxiety disorder     Shoulder arthritis     Sinus bradycardia     CKD (chronic kidney disease) stage 3, GFR 30-59 ml/min     Systolic congestive heart failure, unspecified HF chronicity (H)     Coronary artery disease involving native heart without angina pectoris, unspecified vessel or lesion type     Dermatitis     Anxiety     Osteoarthritis of lumbar spine, unspecified spinal osteoarthritis complication status     Bilateral inguinal hernia without obstruction or gangrene, recurrence not specified     Bilateral recurrent inguinal hernia without obstruction or gangrene     (I10) Essential hypertension  (primary encounter diagnosis)  Comment:   Plan: we discussed that the BP is good off Lasix. They will monitor it and if it goes over 140/90 we will start a new BP med.   Use the non drug therapies.     (R31.0) Gross hematuria  Comment:   Plan: we discussed he needs a Urology appt. They are in [place] and have a primary provider there.   They will get a referral to them.       Tiburcio Landers MD      Phone call duration:  13 minutes

## 2021-01-14 ENCOUNTER — HEALTH MAINTENANCE LETTER (OUTPATIENT)
Age: 81
End: 2021-01-14

## 2021-01-19 ENCOUNTER — MYC MEDICAL ADVICE (OUTPATIENT)
Dept: FAMILY MEDICINE | Facility: CLINIC | Age: 81
End: 2021-01-19

## 2021-01-19 DIAGNOSIS — I25.10 CORONARY ARTERY DISEASE INVOLVING NATIVE HEART WITHOUT ANGINA PECTORIS, UNSPECIFIED VESSEL OR LESION TYPE: Primary | ICD-10-CM

## 2021-01-20 RX ORDER — FUROSEMIDE 20 MG
20 TABLET ORAL DAILY
Qty: 30 TABLET | Refills: 11 | Status: SHIPPED | OUTPATIENT
Start: 2021-01-20

## 2021-01-20 NOTE — TELEPHONE ENCOUNTER
Please notify prescription sent to pharmacy for the 20 mg daily dose in the am for Lasix. Refills done. Call if the dose needs to be increased. .Tiburcio Landers MD

## 2021-01-25 ENCOUNTER — MYC MEDICAL ADVICE (OUTPATIENT)
Dept: FAMILY MEDICINE | Facility: CLINIC | Age: 81
End: 2021-01-25

## 2021-01-25 DIAGNOSIS — I25.10 CORONARY ARTERY DISEASE INVOLVING NATIVE HEART WITHOUT ANGINA PECTORIS, UNSPECIFIED VESSEL OR LESION TYPE: Primary | ICD-10-CM

## 2021-01-27 RX ORDER — LISINOPRIL 10 MG/1
10 TABLET ORAL DAILY
Qty: 90 TABLET | Refills: 3 | Status: SHIPPED | OUTPATIENT
Start: 2021-01-27 | End: 2021-10-06

## 2021-01-27 NOTE — TELEPHONE ENCOUNTER
Please notify prescription sent to pharmacy in [place] for the 10 mg daily dose of the Lisinopril.       Tiburcio Landers MD

## 2021-03-05 ENCOUNTER — MYC MEDICAL ADVICE (OUTPATIENT)
Dept: FAMILY MEDICINE | Facility: CLINIC | Age: 81
End: 2021-03-05

## 2021-03-08 ENCOUNTER — MYC MEDICAL ADVICE (OUTPATIENT)
Dept: FAMILY MEDICINE | Facility: CLINIC | Age: 81
End: 2021-03-08

## 2021-03-08 ASSESSMENT — ANXIETY QUESTIONNAIRES
GAD7 TOTAL SCORE: 9
GAD7 TOTAL SCORE: 9
4. TROUBLE RELAXING: NEARLY EVERY DAY
7. FEELING AFRAID AS IF SOMETHING AWFUL MIGHT HAPPEN: SEVERAL DAYS
5. BEING SO RESTLESS THAT IT IS HARD TO SIT STILL: SEVERAL DAYS
1. FEELING NERVOUS, ANXIOUS, OR ON EDGE: MORE THAN HALF THE DAYS
2. NOT BEING ABLE TO STOP OR CONTROL WORRYING: SEVERAL DAYS
6. BECOMING EASILY ANNOYED OR IRRITABLE: NOT AT ALL
3. WORRYING TOO MUCH ABOUT DIFFERENT THINGS: SEVERAL DAYS
7. FEELING AFRAID AS IF SOMETHING AWFUL MIGHT HAPPEN: SEVERAL DAYS

## 2021-03-09 ASSESSMENT — ANXIETY QUESTIONNAIRES: GAD7 TOTAL SCORE: 9

## 2021-03-09 NOTE — TELEPHONE ENCOUNTER
Dr. Landers,    Patient sends a my chart message about not being able to do a telephone visit as he doesn't want his wife to know about his anxiety.  I did send him a YVAN-7 to update and the total is 9.  Patient is in [place] for the winter and asking for paxil to be refilled as we will not refill his ativan.  Please advise. Lucy DENSON RN

## 2021-03-10 NOTE — TELEPHONE ENCOUNTER
He needs to do some type of visit with me to refill the mental health meds. It has been well over a year. Another option is to find a local doctor to see.     Tiburcio Landers MD

## 2021-05-06 ENCOUNTER — MYC MEDICAL ADVICE (OUTPATIENT)
Dept: FAMILY MEDICINE | Facility: CLINIC | Age: 81
End: 2021-05-06

## 2021-05-06 DIAGNOSIS — I25.10 CORONARY ARTERY DISEASE INVOLVING NATIVE HEART WITHOUT ANGINA PECTORIS, UNSPECIFIED VESSEL OR LESION TYPE: ICD-10-CM

## 2021-05-06 RX ORDER — ROSUVASTATIN CALCIUM 20 MG/1
20 TABLET, COATED ORAL AT BEDTIME
Qty: 90 TABLET | Refills: 1 | Status: SHIPPED | OUTPATIENT
Start: 2021-05-06 | End: 2021-10-13

## 2021-05-06 NOTE — TELEPHONE ENCOUNTER
"Requested Prescriptions   Pending Prescriptions Disp Refills     rosuvastatin (CRESTOR) 20 MG tablet 90 tablet 1     Sig: Take 1 tablet (20 mg) by mouth At Bedtime       Statins Protocol Failed - 5/6/2021  9:46 AM        Failed - LDL on file in past 12 months     Recent Labs   Lab Test 05/01/20  1137   LDL 32             Passed - No abnormal creatine kinase in past 12 months     No lab results found.             Passed - Recent (12 mo) or future (30 days) visit within the authorizing provider's specialty     Patient has had an office visit with the authorizing provider or a provider within the authorizing providers department within the previous 12 mos or has a future within next 30 days. See \"Patient Info\" tab in inbasket, or \"Choose Columns\" in Meds & Orders section of the refill encounter.              Passed - Medication is active on med list        Passed - Patient is age 18 or older             "

## 2021-05-08 ENCOUNTER — HEALTH MAINTENANCE LETTER (OUTPATIENT)
Age: 81
End: 2021-05-08

## 2021-05-10 RX ORDER — ROSUVASTATIN CALCIUM 20 MG/1
20 TABLET, COATED ORAL AT BEDTIME
Qty: 90 TABLET | Refills: 1 | OUTPATIENT
Start: 2021-05-10

## 2021-07-02 ENCOUNTER — TRANSFERRED RECORDS (OUTPATIENT)
Dept: HEALTH INFORMATION MANAGEMENT | Facility: CLINIC | Age: 81
End: 2021-07-02

## 2021-09-20 ENCOUNTER — MYC MEDICAL ADVICE (OUTPATIENT)
Dept: FAMILY MEDICINE | Facility: CLINIC | Age: 81
End: 2021-09-20

## 2021-10-06 ENCOUNTER — OFFICE VISIT (OUTPATIENT)
Dept: FAMILY MEDICINE | Facility: CLINIC | Age: 81
End: 2021-10-06
Payer: COMMERCIAL

## 2021-10-06 VITALS
HEART RATE: 74 BPM | RESPIRATION RATE: 20 BRPM | SYSTOLIC BLOOD PRESSURE: 174 MMHG | DIASTOLIC BLOOD PRESSURE: 94 MMHG | TEMPERATURE: 98.4 F | BODY MASS INDEX: 28.26 KG/M2 | OXYGEN SATURATION: 96 % | HEIGHT: 69 IN | WEIGHT: 190.8 LBS

## 2021-10-06 DIAGNOSIS — I10 UNCONTROLLED HYPERTENSION: ICD-10-CM

## 2021-10-06 DIAGNOSIS — N18.32 STAGE 3B CHRONIC KIDNEY DISEASE (H): ICD-10-CM

## 2021-10-06 DIAGNOSIS — M25.511 ACUTE PAIN OF RIGHT SHOULDER: ICD-10-CM

## 2021-10-06 DIAGNOSIS — I48.20 CHRONIC ATRIAL FIBRILLATION (H): ICD-10-CM

## 2021-10-06 DIAGNOSIS — I50.20 SYSTOLIC CONGESTIVE HEART FAILURE, UNSPECIFIED HF CHRONICITY (H): Primary | ICD-10-CM

## 2021-10-06 DIAGNOSIS — I25.10 CORONARY ARTERY DISEASE INVOLVING NATIVE HEART WITHOUT ANGINA PECTORIS, UNSPECIFIED VESSEL OR LESION TYPE: ICD-10-CM

## 2021-10-06 PROCEDURE — 99214 OFFICE O/P EST MOD 30 MIN: CPT | Performed by: FAMILY MEDICINE

## 2021-10-06 RX ORDER — OXYCODONE AND ACETAMINOPHEN 5; 325 MG/1; MG/1
.5-1 TABLET ORAL EVERY 6 HOURS PRN
Qty: 15 TABLET | Refills: 0 | Status: SHIPPED | OUTPATIENT
Start: 2021-10-06 | End: 2023-07-10

## 2021-10-06 RX ORDER — ZINC GLUCONATE 50 MG
50 TABLET ORAL DAILY
COMMUNITY
End: 2023-10-09

## 2021-10-06 RX ORDER — CHOLECALCIFEROL (VITAMIN D3) 50 MCG
1 TABLET ORAL DAILY
COMMUNITY
End: 2023-10-09

## 2021-10-06 ASSESSMENT — MIFFLIN-ST. JEOR: SCORE: 1555.84

## 2021-10-06 NOTE — PATIENT INSTRUCTIONS
Take oxycodone-acetaminophen 5/325 mg ONE-HALF TO ONE TABLET by mouth 6 hours apart as needed for severe pain only.  Do not drive or drink alcohol when taking this medication.  This is not a long-term medication for pain.    See your doctor in [place] if you have persistent shoulder pain.    Schedule a follow up with your cardiologist as the last visit notes stated that they recommended a follow up this month.  Your blood pressure is very high today but you declined further treatment.  You are very strongly advised to address this with your cardiologist who was also concerned about this on your last visit with them in July 2021.  Continue carvedilol and furosemide - these can help reduce your blood pressure, although they do not seem to be at the adequate dose. Discuss this with your cardiologist.    Be consistent with low salt, low trans fat and low saturated fat diet.  Eat food rich in omega-3-fatty acids as you tolerate. (salmon, olive oil)  Eat 5 cups of vegetables, fruits and whole grains per day.  Limit starchy food (white rice, white bread, white pasta, white potatoes) to less than a cup per meal.  Minimize sweets, junk food and fastfood. Limit soda beverages to one serving per day; best to avoid it altogether though.    Exercise: moderate intensity sustained for at least 30 mins per episode, goal of 150 mins per week at least  Combine cardiovascular and resistance exercises.  These exercise recommendations are in addition to your daily activity at work or home.  Work on losing weight.    Blood tests: schedule fasting lab appointment.

## 2021-10-06 NOTE — PROGRESS NOTES
"    Assessment & Plan     Systolic congestive heart failure, unspecified HF chronicity (H)  Sees cardiology.  Reviewed with patient his last cardiology visit notes.  He declines to follow up with cardiology [facility] this fall unlike what was recommended on that visit.  - ACE/ARB/ARNI NOT PRESCRIBED (INTENTIONAL)    Chronic atrial fibrillation (H)  Per patient cardiologist manages and prescribes anticoagulation.    Stage 3b chronic kidney disease (H)  Review of most recent labs show stable renal function.  - ACE/ARB/ARNI NOT PRESCRIBED (INTENTIONAL)    Acute pain of left shoulder  Patient was advised of risks of opioid analgesic use.  Per patient report he has used \"90-day supply\" over a span of about 180 days.  Patient was advised that he will be given a limited supply of the opiate analgesic for use for severe pain only.  He was advised to see ortho if shoulder pain worsens or lingers for more than 2 weeks.  He was advised to remove the shoulder sling as limiting the movement of the joint can increase risk for adhesive capsulitis or worsening pain due to stiffness of the joint.  - oxyCODONE-acetaminophen (PERCOCET) 5-325 MG tablet  Dispense: 15 tablet; Refill: 0    Uncontrolled hypertension  Patient was advised of risk of very high BPs. He said he is not worried about it.  He was advised of cardiologist recommendations about his BP - he said he will not follow up with cardiology this fall.  Patient is on carvedilol and furosemide but dose does not seem to be enough.  May reconsider ACE-I as his renal function has been stable. Low dose may be appropriate.  Reinforced low salt diet    Coronary artery disease involving native heart without angina pectoris, unspecified vessel or lesion type  Will refill stsatin when result is out. Patient still has about 2 weeks worth left per his med pill count.  - Lipid panel reflex to direct LDL Fasting      Patient Instructions   Take oxycodone-acetaminophen 5/325 mg ONE-HALF TO ONE " TABLET by mouth 6 hours apart as needed for severe pain only.  Do not drive or drink alcohol when taking this medication.  This is not a long-term medication for pain.    See your doctor in [place] if you have persistent shoulder pain.    Schedule a follow up with your cardiologist as the last visit notes stated that they recommended a follow up this month.  Your blood pressure is very high today but you declined further treatment.  You are very strongly advised to address this with your cardiologist who was also concerned about this on your last visit with them in July 2021.  Continue carvedilol and furosemide - these can help reduce your blood pressure, although they do not seem to be at the adequate dose. Discuss this with your cardiologist.    Be consistent with low salt, low trans fat and low saturated fat diet.  Eat food rich in omega-3-fatty acids as you tolerate. (salmon, olive oil)  Eat 5 cups of vegetables, fruits and whole grains per day.  Limit starchy food (white rice, white bread, white pasta, white potatoes) to less than a cup per meal.  Minimize sweets, junk food and fastfood. Limit soda beverages to one serving per day; best to avoid it altogether though.    Exercise: moderate intensity sustained for at least 30 mins per episode, goal of 150 mins per week at least  Combine cardiovascular and resistance exercises.  These exercise recommendations are in addition to your daily activity at work or home.  Work on losing weight.    Blood tests: schedule fasting lab appointment.      Return in about 3 days (around 10/9/2021) for Laboratory apppointment.    Jack Smith MD  [facility]    Latoya Bains is a 81 year old who presents for the following health issues     HPI     New Patient/Transfer of Care  Hyperlipidemia Follow-Up      Are you regularly taking any medication or supplement to lower your cholesterol?   Yes- rosuvastatin    Are you having muscle aches or other  "side effects that you think could be caused by your cholesterol lowering medication?  No    Hypertension Follow-up      Do you check your blood pressure regularly outside of the clinic? Yes     Are you following a low salt diet? No    Are your blood pressures ever more than 140 on the top number (systolic) OR more   than 90 on the bottom number (diastolic), for example 140/90? Yes, a couple of times    Vascular Disease Follow-up      How often do you take nitroglycerin? n/a    Do you take an aspirin every day? Yes  Xarelto, coreg. being prescribed by cardiologist.    Musculoskeletal problem/pain  Onset/Duration: fall of 2020, was seen in [place] for this, torn rotator cuff, has had PT and injection  Description  Location: shoulder - right  Joint Swelling: no  Redness: no  Pain: YES  Warmth: YES- when the most painful  Intensity:  6/10  Progression of Symptoms:  improving  Accompanying signs and symptoms:   Fevers: no  Numbness/tingling/weakness: no  History  Trauma to the area: no, possible overuse  Recent illness:  no  Previous similar problem: no  Previous evaluation:  YES- in [place]  Precipitating or alleviating factors:  Aggravating factors include: overuse  Therapies tried and outcome: rest/inactivity and heat, ocycodone-acetaminophen    Needs refill of tamsulosin in a few months.  Takes for BPH.    Review of Systems   Constitutional, HEENT, cardiovascular, pulmonary, GI, , musculoskeletal, neuro, skin, endocrine and psych systems are negative, except as otherwise noted.      Objective    BP (!) 174/94 (BP Location: Left arm, Patient Position: Chair, Cuff Size: Adult Regular)   Pulse 74   Temp 98.4  F (36.9  C) (Tympanic)   Resp 20   Ht 1.753 m (5' 9\")   Wt 86.5 kg (190 lb 12.8 oz)   SpO2 96%   BMI 28.18 kg/m    Body mass index is 28.18 kg/m .  Physical Exam   GENERAL: alert and no distress, ambulatory w/o assist  NECK: no tenderness, no adenopathy,  Thyroid not enlarged  RESP: lungs clear to " auscultation - no rales, no rhonchi, no wheezes  CV: regular rates and rhythm, no murmur  MS: no edema  SKIN: no suspicious lesions, no rashes  NEURO: no tremors  RIGHT SHOULDER: wears a RUE sling but patient has shown ability to move shoulder; when he removed the sling, he was observed to raise shoulder to at least clavicle level with no pain but reports pain beyond that range.  ABD:  nontender    No results found for any visits on 10/06/21.

## 2021-10-08 ENCOUNTER — MYC MEDICAL ADVICE (OUTPATIENT)
Dept: FAMILY MEDICINE | Facility: CLINIC | Age: 81
End: 2021-10-08

## 2021-10-10 ENCOUNTER — MYC MEDICAL ADVICE (OUTPATIENT)
Dept: FAMILY MEDICINE | Facility: CLINIC | Age: 81
End: 2021-10-10

## 2021-10-11 NOTE — TELEPHONE ENCOUNTER
SKYLER. Please see [employer] message.   I added this pharmacy to his list    Thank you  Sindi MEREDITH RN  .

## 2021-10-11 NOTE — TELEPHONE ENCOUNTER
The shoulder laterality for that visit has been changed in the diagnosis. The exam was correctly referred to the right shoulder.

## 2021-10-13 ENCOUNTER — LAB (OUTPATIENT)
Dept: LAB | Facility: CLINIC | Age: 81
End: 2021-10-13
Payer: COMMERCIAL

## 2021-10-13 DIAGNOSIS — I25.10 CORONARY ARTERY DISEASE INVOLVING NATIVE HEART WITHOUT ANGINA PECTORIS, UNSPECIFIED VESSEL OR LESION TYPE: ICD-10-CM

## 2021-10-13 LAB
CHOLEST SERPL-MCNC: 97 MG/DL
FASTING STATUS PATIENT QL REPORTED: YES
HDLC SERPL-MCNC: 43 MG/DL
LDLC SERPL CALC-MCNC: 37 MG/DL
NONHDLC SERPL-MCNC: 54 MG/DL
TRIGL SERPL-MCNC: 83 MG/DL

## 2021-10-13 PROCEDURE — 80061 LIPID PANEL: CPT

## 2021-10-13 PROCEDURE — 36415 COLL VENOUS BLD VENIPUNCTURE: CPT

## 2021-10-13 RX ORDER — ROSUVASTATIN CALCIUM 20 MG/1
20 TABLET, COATED ORAL AT BEDTIME
Qty: 90 TABLET | Refills: 1 | Status: SHIPPED | OUTPATIENT
Start: 2021-10-13 | End: 2022-04-14

## 2021-10-23 ENCOUNTER — HEALTH MAINTENANCE LETTER (OUTPATIENT)
Age: 81
End: 2021-10-23

## 2022-04-14 DIAGNOSIS — I25.10 CORONARY ARTERY DISEASE INVOLVING NATIVE HEART WITHOUT ANGINA PECTORIS, UNSPECIFIED VESSEL OR LESION TYPE: ICD-10-CM

## 2022-04-14 RX ORDER — ROSUVASTATIN CALCIUM 20 MG/1
TABLET, COATED ORAL
Qty: 90 TABLET | Refills: 1 | Status: SHIPPED | OUTPATIENT
Start: 2022-04-14 | End: 2022-11-21

## 2022-05-07 ENCOUNTER — MYC MEDICAL ADVICE (OUTPATIENT)
Dept: FAMILY MEDICINE | Facility: CLINIC | Age: 82
End: 2022-05-07
Payer: COMMERCIAL

## 2022-05-07 DIAGNOSIS — Z51.81 ENCOUNTER FOR THERAPEUTIC DRUG MONITORING: Primary | ICD-10-CM

## 2022-05-09 NOTE — TELEPHONE ENCOUNTER
Dr. Smith,    Patient recently finished K+ and would like a order for labs.  Pended for your approval.  See my chart message. Lucy DENSON RN

## 2022-05-11 ENCOUNTER — LAB (OUTPATIENT)
Dept: LAB | Facility: CLINIC | Age: 82
End: 2022-05-11
Payer: COMMERCIAL

## 2022-05-11 DIAGNOSIS — Z51.81 ENCOUNTER FOR THERAPEUTIC DRUG MONITORING: ICD-10-CM

## 2022-05-11 LAB — POTASSIUM BLD-SCNC: 4.2 MMOL/L (ref 3.4–5.3)

## 2022-05-11 PROCEDURE — 84132 ASSAY OF SERUM POTASSIUM: CPT

## 2022-05-11 PROCEDURE — 36415 COLL VENOUS BLD VENIPUNCTURE: CPT

## 2022-06-04 ENCOUNTER — HEALTH MAINTENANCE LETTER (OUTPATIENT)
Age: 82
End: 2022-06-04

## 2022-09-23 ENCOUNTER — TRANSFERRED RECORDS (OUTPATIENT)
Dept: HEALTH INFORMATION MANAGEMENT | Facility: CLINIC | Age: 82
End: 2022-09-23

## 2022-10-10 ENCOUNTER — HEALTH MAINTENANCE LETTER (OUTPATIENT)
Age: 82
End: 2022-10-10

## 2022-11-17 DIAGNOSIS — I25.10 CORONARY ARTERY DISEASE INVOLVING NATIVE HEART WITHOUT ANGINA PECTORIS, UNSPECIFIED VESSEL OR LESION TYPE: ICD-10-CM

## 2022-11-21 RX ORDER — ROSUVASTATIN CALCIUM 20 MG/1
20 TABLET, COATED ORAL DAILY
Qty: 90 TABLET | Refills: 0 | Status: SHIPPED | OUTPATIENT
Start: 2022-11-21 | End: 2023-05-19

## 2023-03-25 ENCOUNTER — HEALTH MAINTENANCE LETTER (OUTPATIENT)
Age: 83
End: 2023-03-25

## 2023-06-23 ENCOUNTER — LAB (OUTPATIENT)
Dept: LAB | Facility: CLINIC | Age: 83
End: 2023-06-23
Payer: COMMERCIAL

## 2023-06-23 DIAGNOSIS — I25.10 CORONARY ARTERY DISEASE INVOLVING NATIVE HEART WITHOUT ANGINA PECTORIS, UNSPECIFIED VESSEL OR LESION TYPE: ICD-10-CM

## 2023-06-23 LAB
ERYTHROCYTE [DISTWIDTH] IN BLOOD BY AUTOMATED COUNT: 15.2 % (ref 10–15)
HCT VFR BLD AUTO: 41.3 % (ref 40–53)
HGB BLD-MCNC: 12.9 G/DL (ref 13.3–17.7)
MCH RBC QN AUTO: 30.7 PG (ref 26.5–33)
MCHC RBC AUTO-ENTMCNC: 31.2 G/DL (ref 31.5–36.5)
MCV RBC AUTO: 98 FL (ref 78–100)
PLATELET # BLD AUTO: 164 10E3/UL (ref 150–450)
RBC # BLD AUTO: 4.2 10E6/UL (ref 4.4–5.9)
WBC # BLD AUTO: 6.6 10E3/UL (ref 4–11)

## 2023-06-23 PROCEDURE — 36415 COLL VENOUS BLD VENIPUNCTURE: CPT

## 2023-06-23 PROCEDURE — 85027 COMPLETE CBC AUTOMATED: CPT

## 2023-06-29 ENCOUNTER — MYC MEDICAL ADVICE (OUTPATIENT)
Dept: FAMILY MEDICINE | Facility: CLINIC | Age: 83
End: 2023-06-29
Payer: COMMERCIAL

## 2023-07-18 ENCOUNTER — MYC MEDICAL ADVICE (OUTPATIENT)
Dept: FAMILY MEDICINE | Facility: CLINIC | Age: 83
End: 2023-07-18
Payer: COMMERCIAL

## 2023-07-19 DIAGNOSIS — I25.10 CORONARY ARTERY DISEASE INVOLVING NATIVE HEART WITHOUT ANGINA PECTORIS, UNSPECIFIED VESSEL OR LESION TYPE: ICD-10-CM

## 2023-07-19 RX ORDER — ROSUVASTATIN CALCIUM 20 MG/1
20 TABLET, COATED ORAL DAILY
Qty: 90 TABLET | Refills: 0 | Status: SHIPPED | OUTPATIENT
Start: 2023-07-19 | End: 2023-11-15

## 2023-07-19 NOTE — TELEPHONE ENCOUNTER
"Pending Prescriptions:                       Disp   Refills    rosuvastatin (CRESTOR) 20 MG tablet [Pharm*90 tab*0        Sig: TAKE 1 TABLET BY MOUTH AT BEDTIME . APPOINTMENT           REQUIRED FOR FUTURE REFILLS    Routing refill request to provider for review/approval because:  Requested Prescriptions   Pending Prescriptions Disp Refills    rosuvastatin (CRESTOR) 20 MG tablet [Pharmacy Med Name: Rosuvastatin Calcium 20 MG Oral Tablet] 90 tablet 0     Sig: TAKE 1 TABLET BY MOUTH AT BEDTIME . APPOINTMENT REQUIRED FOR FUTURE REFILLS       Statins Protocol Failed - 7/19/2023  1:27 PM        Failed - LDL on file in past 12 months     Recent Labs   Lab Test 10/13/21  0900   LDL 37             Failed - Recent (12 mo) or future (30 days) visit within the authorizing provider's specialty     Patient has had an office visit with the authorizing provider or a provider within the authorizing providers department within the previous 12 mos or has a future within next 30 days. See \"Patient Info\" tab in inbasket, or \"Choose Columns\" in Meds & Orders section of the refill encounter.              Passed - No abnormal creatine kinase in past 12 months     No lab results found.             Passed - Medication is active on med list        Passed - Patient is age 18 or older           Pt has upcoming appt 7/25/23    Grace Bhardwaj RN        "

## 2023-07-25 ENCOUNTER — OFFICE VISIT (OUTPATIENT)
Dept: FAMILY MEDICINE | Facility: CLINIC | Age: 83
End: 2023-07-25
Payer: COMMERCIAL

## 2023-07-25 VITALS
SYSTOLIC BLOOD PRESSURE: 144 MMHG | RESPIRATION RATE: 16 BRPM | BODY MASS INDEX: 26.07 KG/M2 | DIASTOLIC BLOOD PRESSURE: 86 MMHG | WEIGHT: 186.2 LBS | HEART RATE: 82 BPM | TEMPERATURE: 98.7 F | HEIGHT: 71 IN | OXYGEN SATURATION: 96 %

## 2023-07-25 DIAGNOSIS — N18.32 STAGE 3B CHRONIC KIDNEY DISEASE (H): ICD-10-CM

## 2023-07-25 DIAGNOSIS — I48.91 ATRIAL FIBRILLATION, UNSPECIFIED TYPE (H): ICD-10-CM

## 2023-07-25 DIAGNOSIS — Z12.11 SCREEN FOR COLON CANCER: ICD-10-CM

## 2023-07-25 DIAGNOSIS — M15.0 PRIMARY OSTEOARTHRITIS INVOLVING MULTIPLE JOINTS: ICD-10-CM

## 2023-07-25 DIAGNOSIS — I25.10 CORONARY ARTERY DISEASE INVOLVING NATIVE HEART WITHOUT ANGINA PECTORIS, UNSPECIFIED VESSEL OR LESION TYPE: ICD-10-CM

## 2023-07-25 DIAGNOSIS — Z79.891 CHRONIC PRESCRIPTION OPIATE USE: Primary | ICD-10-CM

## 2023-07-25 DIAGNOSIS — M54.16 LUMBAR RADICULOPATHY: ICD-10-CM

## 2023-07-25 DIAGNOSIS — I50.20 SYSTOLIC CONGESTIVE HEART FAILURE, UNSPECIFIED HF CHRONICITY (H): ICD-10-CM

## 2023-07-25 PROCEDURE — 99214 OFFICE O/P EST MOD 30 MIN: CPT | Performed by: FAMILY MEDICINE

## 2023-07-25 RX ORDER — OXYCODONE AND ACETAMINOPHEN 10; 325 MG/1; MG/1
.5-1 TABLET ORAL EVERY 8 HOURS PRN
Qty: 90 TABLET | Refills: 0 | Status: SHIPPED | OUTPATIENT
Start: 2023-07-25 | End: 2023-10-02

## 2023-07-25 NOTE — PROGRESS NOTES
Assessment & Plan     Chronic prescription opiate use  No concerns on dispensing in PDMP.  Patient  was advised of contents of controlled prescription agreement.  Patient  agreed to abide by the conditions, and agreed to sign.   Patient agreed to random UDS in the future.  Advised safe use of opiates.  Patient to receive refills of his meds in [place] from his pain clinic provider there.  Return precautions discussed and given to patient.   - oxyCODONE-acetaminophen (PERCOCET)  MG per tablet  Dispense: 90 tablet; Refill: 0    Lumbar radiculopathy  No acute findnigs today  - oxyCODONE-acetaminophen (PERCOCET)  MG per tablet  Dispense: 90 tablet; Refill: 0    Primary osteoarthritis involving multiple joints  Managed primarily by ortho in [place].  - oxyCODONE-acetaminophen (PERCOCET)  MG per tablet  Dispense: 90 tablet; Refill: 0    Screen for colon cancer  Patient declines.    Systolic congestive heart failure, unspecified HF chronicity (H)  Managed by cardiologist at [facility].  - TSH WITH FREE T4 REFLEX  - Basic metabolic panel  - Lipid panel reflex to direct LDL Fasting  - ALT    Coronary artery disease involving native heart without angina pectoris, unspecified vessel or lesion type  - TSH WITH FREE T4 REFLEX  - Lipid panel reflex to direct LDL Fasting  - ALT    Atrial fibrillation, unspecified type (H)  Managed by cardiologist at [facility].    Stage 3b chronic kidney disease (H)  Follow renal function. Refer to nephrology as appropriate here in [place]>  He has a nephrologist in [place].     Patient Instructions   Stable pain control.  Continue medications as prescribed.  Do not take more than is prescribed.  If needing more pain control, or feeling need to increase medication, see a provider.  Keep active.  Rest as necessary.  Avoid activities that increase pain.  Follow up in 3-4  months for refills.    Schedule fasting lab appointment at your soonest convenience.    You preferred to receive pneumonia vaccination  "(PREVNAR 20) on another day. Do thi sbefore end of summer this year,    You may get the Shingrix vaccine for shingles if you desire, and after you verify with insurance how they cover the vaccine.     Get a flu shot in the fall.    Schedule medicare wellness exam at your soonest convenience. You declined to complete nor schedule this today.    You concurred to abide by the controlled medication agreement conditions.  You agreed to sign it today.  You concurred to have a urine drug screen today as baseline. This can be done at random times in the future for therapeutic and safety purposes.     Jack Smith MD  [facility] SystemSHELIA Bains is a 83 year old, presenting for the following health issues:  Recheck Medication        7/25/2023     3:46 PM   Additional Questions   Roomed by Shyanne FLANAGAN     Medication Followup of oxycodone-APAP  Taking Medication as prescribed: yes  Side Effects:  None  Medication Helping Symptoms:  5 mg has not been effective and would like to increase his dose to 10 mg of oxycodone - has been given this by his pain specialist in [place], Dr. Alexei Ortega. No notes seen in chart but his name shows up in the Claims Derived Report entries. Patient states he often cuts the tablets in half per dose but sometimes does need the full 10/325 mg dose.  Patient states a 90-tablet Rx typically lasts him 3-4 months. He denies use of illicit or recreational substances      Chronic pains:  - low back with sciatica  - right shoulder pain  - R>L pain when he walks; also hs had chronic waxing and waning \"numbness of either foot.    Most of patient's medical providers are in [place] - spine/ortho, nephrology    Goes to cardiology (Dr. Dave Brown) at [facility] in [place]. For now, he plans to keep current cardiologist. Patient states they refill all his cardiac meds.        Review of Systems   Constitutional, HEENT, cardiovascular, pulmonary, GI, , musculoskeletal, neuro, " "skin, endocrine and psych systems are negative, except as otherwise noted.      Objective    BP (!) 144/86   Pulse 82   Temp 98.7  F (37.1  C) (Tympanic)   Resp 16   Ht 1.803 m (5' 11\")   Wt 84.5 kg (186 lb 3.2 oz)   SpO2 96%   BMI 25.97 kg/m    Body mass index is 25.97 kg/m .  Physical Exam   GENERAL: borderline overweight, normal gait,  alert and no distress  NECK: no tenderness, no adenopathy, no asymmetry, no masses, no stiffness  MS: extremities- no gross deformities noted, no edema; full range of motion with mild pain on right shoulder on full abduction and shoulder raise  SKIN: no suspicious lesions, no rashes  NEURO: strength and tone- normal, sensory exam- grossly normal, reflexes- symmetric  BACK: normal curvature, no deformity, no skin changes, no tendernes on palpation, range of motion limited by pain and stiffness    No results found for any visits on 07/25/23.                "

## 2023-07-25 NOTE — PATIENT INSTRUCTIONS
Stable pain control.  Continue medications as prescribed.  Do not take more than is prescribed.  If needing more pain control, or feeling need to increase medication, see a provider.  Keep active.  Rest as necessary.  Avoid activities that increase pain.  Follow up in 3-4  months for refills.    Schedule fasting lab appointment at your soonest convenience.    You preferred to receive pneumonia vaccination (PREVNAR 20) on another day. Do thi sbefore end of summer this year,    You may get the Shingrix vaccine for shingles if you desire, and after you verify with insurance how they cover the vaccine.     Get a flu shot in the fall.    Schedule medicare wellness exam at your soonest convenience. You declined to complete nor schedule this today.    You concurred to abide by the controlled medication agreement conditions.  You agreed to sign it today.  You concurred to have a urine drug screen today as baseline. This can be done at random times in the future for therapeutic and safety purposes.

## 2023-07-25 NOTE — LETTER
Opioid / Opioid Plus Controlled Substance Agreement    This is an agreement between you and your provider about the safe and appropriate use of controlled substance/opioids prescribed by your care team. Controlled substances are medicines that can cause physical and mental dependence (abuse).    There are strict laws about having and using these medicines. We here at [facility] are committing to working with you in your efforts to get better. To support you in this work, we ll help you schedule regular office appointments for medicine refills. If we must cancel or change your appointment for any reason, we ll make sure you have enough medicine to last until your next appointment.     As a Provider, I will:  Listen carefully to your concerns and treat you with respect.   Recommend a treatment plan that I believe is in your best interest. This plan may involve therapies other than opioid pain medication.   Talk with you often about the possible benefits, and the risk of harm of any medicine that we prescribe for you.   Provide a plan on how to taper (discontinue or go off) using this medicine if the decision is made to stop its use.    As a Patient, I understand that opioid(s):   Are a controlled substance prescribed by my care team to help me function or work and manage my condition(s).   Are strong medicines and can cause serious side effects such as:  Drowsiness, which can seriously affect my driving ability  A lower breathing rate, enough to cause death  Harm to my thinking ability   Depression   Abuse of and addiction to this medicine  Need to be taken exactly as prescribed. Combining opioids with certain medicines or chemicals (such as illegal drugs, sedatives, sleeping pills, and benzodiazepines) can be dangerous or even fatal. If I stop opioids suddenly, I may have severe withdrawal symptoms.  Do not work for all types of pain nor for all patients. If they re not helpful, I may be asked to stop  them.        The risks, benefits and side effects of these medicine(s) were explained to me. I agree that:  I will take part in other treatments as advised by my care team. This may be psychiatry or counseling, physical therapy, behavioral therapy, group treatment or a referral to a specialist.     I will keep all my appointments. I understand that this is part of the monitoring of opioids. My care team may require an office visit for EVERY opioid/controlled substance refill. If I miss appointments or don t follow instructions, my care team may stop my medicine.    I will take my medicines as prescribed. I will not change the dose or schedule unless my care team tells me to. There will be no refills if I run out early.     I may be asked to come to the clinic and complete a urine drug test or complete a pill count at any time. If I don t give a urine sample or participate in a pill count, the care team may stop my medicine.    I will only receive prescriptions from this clinic for chronic pain. If I am treated by another provider for acute pain issues, I will tell them that I am taking opioid pain medication for chronic pain and that I have a treatment agreement with this provider. I will inform my [facility] care team within one business day if I am given a prescription for any pain medication by another healthcare provider. My [facility] care team can contact other providers and pharmacists about my use of any medicines.    It is up to me to make sure that I don t run out of my medicines on weekends or holidays. If my care team is willing to refill my opioid prescription without a visit, I must request refills only during office hours. Refills may take up to 3 business days to process. I will use one pharmacy to fill all my opioid and other controlled substance prescriptions. I will notify the clinic about any changes to my insurance or medication availability.    I am responsible for my  prescriptions. If the medicine/prescription is lost, stolen or destroyed, it will not be replaced. I also agree not to share controlled substance medicines with anyone.    I am aware I should not use any illegal or recreational drugs. I agree not to drink alcohol unless my care team says I can.       If I enroll in the [place] [facility] program, I will tell my care team prior to my next refill.     I will tell my care team right away if I become pregnant, have a new medical problem treated outside of my regular clinic, or have a change in my medications.    I understand that this medicine can affect my thinking, judgment and reaction time. Alcohol and drugs affect the brain and body, which can affect the safety of my driving. Being under the influence of alcohol or drugs can affect my decision-making, behaviors, personal safety, and the safety of others. Driving while impaired (DWI) can occur if a person is driving, operating, or in physical control of a car, motorcycle, boat, snowmobile, ATV, motorbike, off-road vehicle, or any other motor vehicle ([place] Statute 169A.20). I understand the risk if I choose to drive or operate any vehicle or machinery.    I understand that if I do not follow any of the conditions above, my prescriptions or treatment may be stopped or changed.          Opioids  What You Need to Know    What are opioids?   Opioids are pain medicines that must be prescribed by a doctor. They are also known as narcotics.     Examples are:   morphine (MS Contin, Lety)  oxycodone (Oxycontin)  oxycodone and acetaminophen (Percocet)  hydrocodone and acetaminophen (Vicodin, Norco)   fentanyl patch (Duragesic)   hydromorphone (Dilaudid)   methadone  codeine (Tylenol #3)     What do opioids do well?   Opioids are best for severe short-term pain such as after a surgery or injury. They may work well for cancer pain. They may help some people with long-lasting (chronic) pain.     What do opioids NOT do  well?   Opioids never get rid of pain entirely, and they don t work well for most patients with chronic pain. Opioids don t reduce swelling, one of the causes of pain.                                    Other ways to manage chronic pain and improve function include:     Treat the health problem that may be causing pain  Anti-inflammation medicines, which reduce swelling and tenderness, such as ibuprofen (Advil, Motrin) or naproxen (Aleve)  Acetaminophen (Tylenol)  Antidepressants and anti-seizure medicines, especially for nerve pain  Topical treatments such as patches or creams  Injections or nerve blocks  Chiropractic or osteopathic treatment  Acupuncture, massage, deep breathing, meditation, visual imagery, aromatherapy  Use heat or ice at the pain site  Physical therapy   Exercise  Stop smoking  Take part in therapy       Risks and side effects     Talk to your doctor before you start or decide to keep taking opioids. Possible side effects include:    Lowering your breathing rate enough to cause death  Overdose, including death, especially if taking higher than prescribed doses  Worse depression symptoms; less pleasure in things you usually enjoy  Feeling tired or sluggish  Slower thoughts or cloudy thinking  Being more sensitive to pain over time; pain is harder to control  Trouble sleeping or restless sleep  Changes in hormone levels (for example, less testosterone)  Changes in sex drive or ability to have sex  Constipation  Unsafe driving  Itching and sweating  Dizziness  Nausea, throwing up and dry mouth    What else should I know about opioids?    Opioids may lead to dependence, tolerance, or addiction.    Dependence means that if you stop or reduce the medicine too quickly, you will have withdrawal symptoms. These include loose poop (diarrhea), jitters, flu-like symptoms, nervousness and tremors. Dependence is not the same as addiction.                     Tolerance means needing higher doses over time to  get the same effect. This may increase the chance of serious side effects.    Addiction is when people improperly use a substance that harms their body, their mind or their relations with others. Use of opiates can cause a relapse of addiction if you have a history of drug or alcohol abuse.    People who have used opioids for a long time may have a lower quality of life, worse depression, higher levels of pain and more visits to doctors.    You can overdose on opioids. Take these steps to lower your risk of overdose:    Recognize the signs:  Signs of overdose include decrease or loss of consciousness (blackout), slowed breathing, trouble waking up and blue lips. If someone is worried about overdose, they should call 911.    Talk to your doctor about Narcan (naloxone).   If you are at risk for overdose, you may be given a prescription for Narcan. This medicine very quickly reverses the effects of opioids.   If you overdose, a friend or family member can give you Narcan while waiting for the ambulance. They need to know the signs of overdose and how to give Narcan.     Don't use alcohol or street drugs.   Taking them with opioids can cause death.    Do not take any of these medicines unless your doctor says it s OK. Taking these with opioids can cause death:  Benzodiazepines, such as lorazepam (Ativan), alprazolam (Xanax) or diazepam (Valium)  Muscle relaxers, such as cyclobenzaprine (Flexeril)  Sleeping pills like zolpidem (Ambien)   Other opioids      How to keep you and other people safe while taking opioids:    Never share your opioids with others.  Opioid medicines are regulated by the Drug Enforcement Agency (ALEX). Selling or sharing medications is a criminal act.    2. Be sure to store opioids in a secure place, locked up if possible. Young children can easily swallow them and overdose.    3. When you are traveling with your medicines, keep them in the original bottles. If you use a pill box, be sure you also  carry a copy of your medicine list from your clinic or pharmacy.    4. Safe disposal of opioids    Most pharmacies have places to get rid of medicine, called disposal kiosks. Medicine disposal options are also available in every [facility]. Search your county and  medication disposal  to find more options. You can find more details at:  [email or web address]     I agree that my provider, clinic care team, and pharmacy may work with any city, state or federal law enforcement agency that investigates the misuse, sale, or other diversion of my controlled medicine. I will allow my provider to discuss my care with, or share a copy of, this agreement with any other treating provider, pharmacy or emergency room where I receive care.    I have read this agreement and have asked questions about anything I did not understand.    _______________________________________________________  Patient Signature - Herson Mondragon _____________________                   Date     _______________________________________________________  Provider Signature - Jack Smith MD   _____________________                   Date     _______________________________________________________  Witness Signature (required if provider not present while patient signing)   _____________________                   Date

## 2023-08-16 ENCOUNTER — LAB (OUTPATIENT)
Dept: LAB | Facility: CLINIC | Age: 83
End: 2023-08-16
Payer: COMMERCIAL

## 2023-08-16 DIAGNOSIS — I50.20 SYSTOLIC CONGESTIVE HEART FAILURE, UNSPECIFIED HF CHRONICITY (H): ICD-10-CM

## 2023-08-16 DIAGNOSIS — N18.32 STAGE 3B CHRONIC KIDNEY DISEASE (H): Primary | ICD-10-CM

## 2023-08-16 DIAGNOSIS — I25.10 CORONARY ARTERY DISEASE INVOLVING NATIVE HEART WITHOUT ANGINA PECTORIS, UNSPECIFIED VESSEL OR LESION TYPE: ICD-10-CM

## 2023-08-16 LAB
ALT SERPL W P-5'-P-CCNC: 12 U/L (ref 0–70)
ANION GAP SERPL CALCULATED.3IONS-SCNC: 11 MMOL/L (ref 7–15)
BUN SERPL-MCNC: 20.3 MG/DL (ref 8–23)
CALCIUM SERPL-MCNC: 10.3 MG/DL (ref 8.8–10.2)
CHLORIDE SERPL-SCNC: 103 MMOL/L (ref 98–107)
CHOLEST SERPL-MCNC: 95 MG/DL
CREAT SERPL-MCNC: 1.66 MG/DL (ref 0.67–1.17)
DEPRECATED HCO3 PLAS-SCNC: 30 MMOL/L (ref 22–29)
GFR SERPL CREATININE-BSD FRML MDRD: 40 ML/MIN/1.73M2
GLUCOSE SERPL-MCNC: 102 MG/DL (ref 70–99)
HDLC SERPL-MCNC: 39 MG/DL
HGB BLD-MCNC: 13.9 G/DL (ref 13.3–17.7)
LDLC SERPL CALC-MCNC: 42 MG/DL
NONHDLC SERPL-MCNC: 56 MG/DL
POTASSIUM SERPL-SCNC: 4.2 MMOL/L (ref 3.4–5.3)
SODIUM SERPL-SCNC: 144 MMOL/L (ref 136–145)
TRIGL SERPL-MCNC: 68 MG/DL
TSH SERPL DL<=0.005 MIU/L-ACNC: 1.71 UIU/ML (ref 0.3–4.2)

## 2023-08-16 PROCEDURE — 36415 COLL VENOUS BLD VENIPUNCTURE: CPT

## 2023-08-16 PROCEDURE — 84460 ALANINE AMINO (ALT) (SGPT): CPT

## 2023-08-16 PROCEDURE — 80061 LIPID PANEL: CPT

## 2023-08-16 PROCEDURE — 85018 HEMOGLOBIN: CPT

## 2023-08-16 PROCEDURE — 80048 BASIC METABOLIC PNL TOTAL CA: CPT

## 2023-08-16 PROCEDURE — 84443 ASSAY THYROID STIM HORMONE: CPT

## 2023-09-15 ENCOUNTER — MYC MEDICAL ADVICE (OUTPATIENT)
Dept: FAMILY MEDICINE | Facility: CLINIC | Age: 83
End: 2023-09-15

## 2023-09-15 ENCOUNTER — OFFICE VISIT (OUTPATIENT)
Dept: FAMILY MEDICINE | Facility: CLINIC | Age: 83
End: 2023-09-15
Payer: COMMERCIAL

## 2023-09-15 ENCOUNTER — LAB (OUTPATIENT)
Dept: LAB | Facility: CLINIC | Age: 83
End: 2023-09-15
Payer: COMMERCIAL

## 2023-09-15 DIAGNOSIS — Z12.5 SCREENING FOR PROSTATE CANCER: ICD-10-CM

## 2023-09-15 DIAGNOSIS — R31.0 GROSS HEMATURIA: ICD-10-CM

## 2023-09-15 DIAGNOSIS — R31.0 GROSS HEMATURIA: Primary | ICD-10-CM

## 2023-09-15 DIAGNOSIS — N18.32 STAGE 3B CHRONIC KIDNEY DISEASE (H): ICD-10-CM

## 2023-09-15 DIAGNOSIS — K92.1 BLOOD IN STOOL: ICD-10-CM

## 2023-09-15 DIAGNOSIS — R19.4 CHANGE IN BOWEL HABITS: ICD-10-CM

## 2023-09-15 LAB
ALBUMIN SERPL BCG-MCNC: 4.5 G/DL (ref 3.5–5.2)
ALBUMIN UR-MCNC: 30 MG/DL
ALP SERPL-CCNC: 93 U/L (ref 40–129)
ALT SERPL W P-5'-P-CCNC: 20 U/L (ref 0–70)
ANION GAP SERPL CALCULATED.3IONS-SCNC: 8 MMOL/L (ref 7–15)
APPEARANCE UR: ABNORMAL
AST SERPL W P-5'-P-CCNC: 30 U/L (ref 0–45)
BACTERIA #/AREA URNS HPF: ABNORMAL /HPF
BILIRUB SERPL-MCNC: 0.6 MG/DL
BILIRUB UR QL STRIP: NEGATIVE
BUN SERPL-MCNC: 25.2 MG/DL (ref 8–23)
CALCIUM SERPL-MCNC: 10.6 MG/DL (ref 8.8–10.2)
CHLORIDE SERPL-SCNC: 102 MMOL/L (ref 98–107)
COLOR UR AUTO: YELLOW
CREAT SERPL-MCNC: 1.58 MG/DL (ref 0.67–1.17)
CREAT UR-MCNC: 127.8 MG/DL
DEPRECATED HCO3 PLAS-SCNC: 31 MMOL/L (ref 22–29)
EGFRCR SERPLBLD CKD-EPI 2021: 43 ML/MIN/1.73M2
ERYTHROCYTE [DISTWIDTH] IN BLOOD BY AUTOMATED COUNT: 14 % (ref 10–15)
GLUCOSE SERPL-MCNC: 112 MG/DL (ref 70–99)
GLUCOSE UR STRIP-MCNC: NEGATIVE MG/DL
HCT VFR BLD AUTO: 44.9 % (ref 40–53)
HGB BLD-MCNC: 14.2 G/DL (ref 13.3–17.7)
HGB UR QL STRIP: ABNORMAL
KETONES UR STRIP-MCNC: NEGATIVE MG/DL
LEUKOCYTE ESTERASE UR QL STRIP: ABNORMAL
MCH RBC QN AUTO: 30 PG (ref 26.5–33)
MCHC RBC AUTO-ENTMCNC: 31.6 G/DL (ref 31.5–36.5)
MCV RBC AUTO: 95 FL (ref 78–100)
MICROALBUMIN UR-MCNC: 132.1 MG/L
MICROALBUMIN/CREAT UR: 103.36 MG/G CR (ref 0–17)
NITRATE UR QL: NEGATIVE
PH UR STRIP: 6 [PH] (ref 5–7)
PLATELET # BLD AUTO: 223 10E3/UL (ref 150–450)
POTASSIUM SERPL-SCNC: 3.8 MMOL/L (ref 3.4–5.3)
PROT SERPL-MCNC: 7.2 G/DL (ref 6.4–8.3)
PSA SERPL DL<=0.01 NG/ML-MCNC: 1.05 NG/ML
RBC # BLD AUTO: 4.74 10E6/UL (ref 4.4–5.9)
RBC #/AREA URNS AUTO: ABNORMAL /HPF
SODIUM SERPL-SCNC: 141 MMOL/L (ref 136–145)
SP GR UR STRIP: 1.01 (ref 1–1.03)
SQUAMOUS #/AREA URNS AUTO: ABNORMAL /LPF
UROBILINOGEN UR STRIP-ACNC: 0.2 E.U./DL
WBC # BLD AUTO: 9.5 10E3/UL (ref 4–11)
WBC #/AREA URNS AUTO: ABNORMAL /HPF

## 2023-09-15 PROCEDURE — 36415 COLL VENOUS BLD VENIPUNCTURE: CPT | Performed by: NURSE PRACTITIONER

## 2023-09-15 PROCEDURE — G0103 PSA SCREENING: HCPCS | Performed by: NURSE PRACTITIONER

## 2023-09-15 PROCEDURE — 82570 ASSAY OF URINE CREATININE: CPT

## 2023-09-15 PROCEDURE — 81001 URINALYSIS AUTO W/SCOPE: CPT

## 2023-09-15 PROCEDURE — 85027 COMPLETE CBC AUTOMATED: CPT | Performed by: NURSE PRACTITIONER

## 2023-09-15 PROCEDURE — 80053 COMPREHEN METABOLIC PANEL: CPT | Performed by: NURSE PRACTITIONER

## 2023-09-15 PROCEDURE — 82043 UR ALBUMIN QUANTITATIVE: CPT

## 2023-09-15 PROCEDURE — 99214 OFFICE O/P EST MOD 30 MIN: CPT | Performed by: NURSE PRACTITIONER

## 2023-09-15 ASSESSMENT — PAIN SCALES - GENERAL: PAINLEVEL: NO PAIN (0)

## 2023-09-15 NOTE — PROGRESS NOTES
Assessment & Plan     Gross hematuria  -UA positive for hematuria, negative for UTI   -prostate-specific antigen test normal  -patient has history of kidney stones, recommended CT scan for additional evaluation   - UA with Microscopic reflex to Culture - lab collect; Future  - CT Abdomen Pelvis w/o & w Contrast; Future  - CBC with platelets; Future  - CBC with platelets    Blood in stool    - CT Abdomen Pelvis w/o & w Contrast; Future  - Comprehensive metabolic panel (BMP + Alb, Alk Phos, ALT, AST, Total. Bili, TP); Future  - CBC with platelets; Future  - Comprehensive metabolic panel (BMP + Alb, Alk Phos, ALT, AST, Total. Bili, TP)  - CBC with platelets    Change in bowel habits    - CT Abdomen Pelvis w/o & w Contrast; Future    Screening for prostate cancer  - PSA, screen; Future  - PSA, screen        CANDICE Perez [facility]    Latoya Bains is a 83 year old, presenting for the following health issues: blood in stools and urine, started about 3-7 days ago. Denies diarrhea, had constipation, but it resolved now. Denies severe abdominal pain, complains of generalized abdominal discomfort.           9/15/2023     2:35 PM   Additional Questions   Roomed by kishor sotomayor   Accompanied by wife         9/15/2023     2:35 PM   Patient Reported Additional Medications   Patient reports taking the following new medications none       History of Present Illness       Reason for visit:  Discussing possible colon problems with the doctor  Symptom onset:  3-7 days ago  Symptom intensity:  Moderate  Symptom progression:  Staying the same  Had these symptoms before:  No  What makes it worse:  Trying to have a bowel movement  What makes it better:  Having a bowel movement    He eats 2-3 servings of fruits and vegetables daily.He consumes 1 sweetened beverage(s) daily.He exercises with enough effort to increase his heart rate 9 or less minutes per day.  He exercises with enough effort to  "increase his heart rate 3 or less days per week.   He is taking medications regularly.       Concern - colon   Onset: 3-7 days  Description: Discussing possible colon problems with the doctor  Intensity: moderate  Progression of Symptoms:  same  Accompanying Signs & Symptoms: blood in urine and stools   Previous history of similar problem: mom had colon cancer would like a colonoscopy have left a UA all ready today at 1045 at [place]   Precipitating factors:        Worsened by: trying having a bowel movement  Alleviating factors:        Improved by: having a bowel movement   Therapies tried and outcome: stool softeners, more fiber foods         Review of Systems   Constitutional, HEENT, cardiovascular, pulmonary, gi and gu systems are negative, except as otherwise noted.      Objective    /80 (BP Location: Right arm, Patient Position: Sitting)   Pulse 73   Temp 98  F (36.7  C) (Tympanic)   Resp 16   Ht 1.803 m (5' 11\")   Wt 82.1 kg (180 lb 14.4 oz)   SpO2 96%   BMI 25.23 kg/m    Body mass index is 25.23 kg/m .  Physical Exam   GENERAL: healthy, alert and no distress  RESP: lungs clear to auscultation - no rales, rhonchi or wheezes  CV: regular rate and rhythm, normal S1 S2, no S3 or S4, no murmur, click or rub, no peripheral edema and peripheral pulses strong  ABDOMEN: soft, nontender, no hepatosplenomegaly, no masses and bowel sounds normal  SKIN: no suspicious lesions or rashes  NEURO: Normal strength and tone, mentation intact and speech normal  PSYCH: mentation appears normal, affect normal/bright    Results for orders placed or performed in visit on 09/15/23   Comprehensive metabolic panel (BMP + Alb, Alk Phos, ALT, AST, Total. Bili, TP)     Status: Abnormal   Result Value Ref Range    Sodium 141 136 - 145 mmol/L    Potassium 3.8 3.4 - 5.3 mmol/L    Chloride 102 98 - 107 mmol/L    Carbon Dioxide (CO2) 31 (H) 22 - 29 mmol/L    Anion Gap 8 7 - 15 mmol/L    Urea Nitrogen 25.2 (H) 8.0 - 23.0 mg/dL    " Creatinine 1.58 (H) 0.67 - 1.17 mg/dL    Calcium 10.6 (H) 8.8 - 10.2 mg/dL    Glucose 112 (H) 70 - 99 mg/dL    Alkaline Phosphatase 93 40 - 129 U/L    AST 30 0 - 45 U/L    ALT 20 0 - 70 U/L    Protein Total 7.2 6.4 - 8.3 g/dL    Albumin 4.5 3.5 - 5.2 g/dL    Bilirubin Total 0.6 <=1.2 mg/dL    GFR Estimate 43 (L) >60 mL/min/1.73m2   CBC with platelets     Status: Normal   Result Value Ref Range    WBC Count 9.5 4.0 - 11.0 10e3/uL    RBC Count 4.74 4.40 - 5.90 10e6/uL    Hemoglobin 14.2 13.3 - 17.7 g/dL    Hematocrit 44.9 40.0 - 53.0 %    MCV 95 78 - 100 fL    MCH 30.0 26.5 - 33.0 pg    MCHC 31.6 31.5 - 36.5 g/dL    RDW 14.0 10.0 - 15.0 %    Platelet Count 223 150 - 450 10e3/uL   PSA, screen     Status: None   Result Value Ref Range    Prostate Specific Antigen Screen 1.05 ng/mL    Narrative    This result is obtained using the Roche Elecsys total PSA method on the prachi [id number] immunoassay analyzer. Results obtained with different assay methods or kits cannot be used interchangeably.   Results for orders placed or performed in visit on 09/15/23   Albumin Random Urine Quantitative with Creat Ratio     Status: Abnormal   Result Value Ref Range    Creatinine Urine mg/dL 127.8 mg/dL    Albumin Urine mg/L 132.1 mg/L    Albumin Urine mg/g Cr 103.36 (H) 0.00 - 17.00 mg/g Cr   UA with Microscopic reflex to Culture - lab collect     Status: Abnormal    Specimen: Urine, NOS   Result Value Ref Range    Color Urine Yellow Colorless, Straw, Light Yellow, Yellow    Appearance Urine Cloudy (A) Clear    Glucose Urine Negative Negative mg/dL    Bilirubin Urine Negative Negative    Ketones Urine Negative Negative mg/dL    Specific Gravity Urine 1.015 1.003 - 1.035    Blood Urine Large (A) Negative    pH Urine 6.0 5.0 - 7.0    Protein Albumin Urine 30 (A) Negative mg/dL    Urobilinogen Urine 0.2 0.2, 1.0 E.U./dL    Nitrite Urine Negative Negative    Leukocyte Esterase Urine Trace (A) Negative   UA Microscopic with Reflex to Culture      Status: Abnormal   Result Value Ref Range    Bacteria Urine Few (A) None Seen /HPF    RBC Urine  (A) 0-2 /HPF /HPF    WBC Urine 0-5 0-5 /HPF /HPF    Squamous Epithelials Urine Few (A) None Seen /LPF    Narrative    Urine Culture not indicated

## 2023-09-18 ENCOUNTER — TELEPHONE (OUTPATIENT)
Dept: FAMILY MEDICINE | Facility: CLINIC | Age: 83
End: 2023-09-18
Payer: COMMERCIAL

## 2023-09-18 VITALS
RESPIRATION RATE: 16 BRPM | DIASTOLIC BLOOD PRESSURE: 80 MMHG | TEMPERATURE: 98 F | HEIGHT: 71 IN | HEART RATE: 73 BPM | OXYGEN SATURATION: 96 % | BODY MASS INDEX: 25.32 KG/M2 | WEIGHT: 180.9 LBS | SYSTOLIC BLOOD PRESSURE: 139 MMHG

## 2023-09-18 NOTE — TELEPHONE ENCOUNTER
Order/Referral Request    Who is requesting: Pt    Orders being requested: CT scan    Reason service is needed/diagnosis: Was recommended to get a CT by Dai Doherty MD    When are orders needed by: ASAP    Has this been discussed with Provider: Yes    Does patient have a preference on a Group/Provider/Facility? [facility]    Does patient have an appointment scheduled?: No    Where to send orders: Place orders within Epic    Could we send this information to you in [facility] or would you prefer to receive a phone call?:   Patient would prefer a phone call   Okay to leave a detailed message?: Yes at Home number on file [phone] (home)

## 2023-09-21 ENCOUNTER — HOSPITAL ENCOUNTER (OUTPATIENT)
Dept: CT IMAGING | Facility: CLINIC | Age: 83
Discharge: HOME | End: 2023-09-21
Attending: NURSE PRACTITIONER | Admitting: NURSE PRACTITIONER
Payer: COMMERCIAL

## 2023-09-21 DIAGNOSIS — R31.0 GROSS HEMATURIA: ICD-10-CM

## 2023-09-21 DIAGNOSIS — K92.1 BLOOD IN STOOL: ICD-10-CM

## 2023-09-21 DIAGNOSIS — R19.4 CHANGE IN BOWEL HABITS: ICD-10-CM

## 2023-09-21 LAB
CREAT BLD-MCNC: 1.7 MG/DL (ref 0.7–1.3)
EGFRCR SERPLBLD CKD-EPI 2021: 39 ML/MIN/1.73M2

## 2023-09-21 PROCEDURE — 82565 ASSAY OF CREATININE: CPT

## 2023-09-21 PROCEDURE — 250N000011 HC RX IP 250 OP 636: Performed by: RADIOLOGY

## 2023-09-21 PROCEDURE — 250N000009 HC RX 250: Performed by: RADIOLOGY

## 2023-09-21 PROCEDURE — 74178 CT ABD&PLV WO CNTR FLWD CNTR: CPT

## 2023-09-21 RX ORDER — IOPAMIDOL 755 MG/ML
70 INJECTION, SOLUTION INTRAVASCULAR ONCE
Status: COMPLETED | OUTPATIENT
Start: 2023-09-21 | End: 2023-09-21

## 2023-09-21 RX ORDER — IOPAMIDOL 755 MG/ML
89 INJECTION, SOLUTION INTRAVASCULAR ONCE
Status: DISCONTINUED | OUTPATIENT
Start: 2023-09-21 | End: 2023-09-21

## 2023-09-21 RX ADMIN — IOPAMIDOL 70 ML: 755 INJECTION, SOLUTION INTRAVENOUS at 15:03

## 2023-09-21 RX ADMIN — SODIUM CHLORIDE 100 ML: 9 INJECTION, SOLUTION INTRAVENOUS at 15:04

## 2023-09-25 ENCOUNTER — PATIENT OUTREACH (OUTPATIENT)
Dept: ONCOLOGY | Facility: CLINIC | Age: 83
End: 2023-09-25
Payer: COMMERCIAL

## 2023-09-25 DIAGNOSIS — R31.0 GROSS HEMATURIA: ICD-10-CM

## 2023-09-25 DIAGNOSIS — R91.8 PULMONARY NODULES: ICD-10-CM

## 2023-09-25 DIAGNOSIS — N20.0 KIDNEY STONE: Primary | ICD-10-CM

## 2023-09-25 NOTE — PROGRESS NOTES
New IP (Interventional Pulmonology) referral rec'd.  Chart reviewed.        New Patient: Interventional Pulmonary (Lung nodule) Nurse Navigator Note    Referring provider[facility]    Referred to (specialty): Interventional Pulmonary (Lung nodule)    Requested provider (if applicable): n/a    Date Referral Received: 9/25/2023    Evaluation for :  Lung nodule    Clinical History (per Nurse review of records provided):    **BOOK MARKED**  CT ABDOMEN AND PELVIS WITHOUT AND WITH CONTRAST September 21, 2023  FINDINGS:  Lung bases: There is a 10 x 6 mm nodule in the posterior right lower  lobe on image 19 of series 7. Mild interstitial thickening is seen in  the posterior aspect of both lower lobes, more prominent on the right.     Hepatobiliary: Cyst with some calcifications at the margin in the  anterior left hepatic lobe. Dependent sludge or stones are seen within  the gallbladder.     Pancreas: Normal.     Spleen: Normal.     Adrenals: Normal.     Right kidney: Multiple nonobstructing stones are seen through the  right kidney measuring up to 4 mm in size. No ureteral calculi or  hydronephrosis. Nonenhancing cysts are seen in the right kidney. No  follow-up necessary.     Left kidney: A few tiny nonobstructing stones are seen through the  left kidney and one 5 mm stone is seen in the midportion. No ureteral  calculi or hydronephrosis. Multiple small cysts are seen through the  left kidney. No follow-up necessary for these.     Bladder: The prostate is enlarged and indents the underside of the  bladder. Mild posterior bladder trabeculation is noted. No evidence  for mass.     Bowel: No bowel obstruction. Sigmoid diverticulosis is noted.  Scattered diverticuli are seen elsewhere through the colon as well to  a lesser extent. No evidence for diverticulitis.     Pelvic organs: No free fluid or adenopathy in the pelvis.     Other: The aorta is  normal in caliber with extensive atherosclerotic  calcification. No adenopathy.     MUSCULOSKELETAL: Degenerative changes are noted through the spine.                                                                      IMPRESSION:  1. Multiple nonobstructing stones through both kidneys measuring up to  5 mm in size. No ureteral calculi or hydronephrosis on either side.  2. No evidence for renal or bladder mass.  3. Enlarged prostate indents the undersurface of the bladder.  4. 10 x 6 mm nodule in the posterior right lower lobe. Three-month  follow-up CT is recommended.      Records Location (Care Everywhere, Media, etc.): [facility]     RECORDS NEEDED:  Last FIVE years CHEST Imaging pushed to PACS from [facility]----thank you!!       Additional testing needed prior to consult: CT Chest--ordered by referring

## 2023-09-26 ENCOUNTER — PRE VISIT (OUTPATIENT)
Dept: ONCOLOGY | Facility: CLINIC | Age: 83
End: 2023-09-26
Payer: COMMERCIAL

## 2023-09-26 NOTE — PROGRESS NOTES
I rec'd an IB from [facility] in medical records that pt's wife is asking for a nurse to call them.  I called and spoke with Lashay.  They are heading to [place] shortly until mid April and are wondering if they can't wait until they return for [place] to have his CT Chest and consult with IP.  I said we can do that.  I will defer his referral and call them in March. They would also appreciate going to a doctor in [place] but I explained the closest would be [place].  I will check in March to see if there are any new locations or if perhaps, Toshia Franklin CNP can see them in [place].

## 2023-09-26 NOTE — TELEPHONE ENCOUNTER
Action Taken  Date/Description  MORE     N Navigator September 26, 2023  9:55 AM   RECORDS NEEDED:  Last FIVE years CHEST Imaging pushed to PACS from [facility]----thank you!!     They will be leaving in a week for the winter in [place] and would like a NN to call them to discuss options further.  Sent IB for them to call Pt's wife Lashay.     Records Requested     September 27, 2023 11:29 AM    MORE   Clinic name Comments/Action Taken   [facility] Per IB, called [facility] and requested all chest imaging over last 5 years     Imaging Received  September 28, 2023    1:17 PM  MORE   Action: Images from [facility] received and resolved to PACS.

## 2023-09-30 ENCOUNTER — MYC MEDICAL ADVICE (OUTPATIENT)
Dept: FAMILY MEDICINE | Facility: CLINIC | Age: 83
End: 2023-09-30
Payer: COMMERCIAL

## 2023-09-30 DIAGNOSIS — Z79.891 CHRONIC PRESCRIPTION OPIATE USE: ICD-10-CM

## 2023-09-30 DIAGNOSIS — M54.16 LUMBAR RADICULOPATHY: ICD-10-CM

## 2023-09-30 DIAGNOSIS — M15.0 PRIMARY OSTEOARTHRITIS INVOLVING MULTIPLE JOINTS: ICD-10-CM

## 2023-10-02 ENCOUNTER — MYC MEDICAL ADVICE (OUTPATIENT)
Dept: FAMILY MEDICINE | Facility: CLINIC | Age: 83
End: 2023-10-02
Payer: COMMERCIAL

## 2023-10-02 RX ORDER — OXYCODONE AND ACETAMINOPHEN 10; 325 MG/1; MG/1
.5-1 TABLET ORAL EVERY 8 HOURS PRN
Qty: 10 TABLET | Refills: 0 | Status: SHIPPED | OUTPATIENT
Start: 2023-10-02 | End: 2023-10-09

## 2023-10-02 NOTE — TELEPHONE ENCOUNTER
Dr Smith  Per mychart pt requesting 10 oxycodone to get him by until his 10-9 appt. Pt states [place] more pain in [place] than [place] and that is why he is using more.       Neptali Prather RN

## 2023-10-09 ENCOUNTER — VIRTUAL VISIT (OUTPATIENT)
Dept: FAMILY MEDICINE | Facility: CLINIC | Age: 83
End: 2023-10-09
Payer: COMMERCIAL

## 2023-10-09 DIAGNOSIS — M15.0 PRIMARY OSTEOARTHRITIS INVOLVING MULTIPLE JOINTS: ICD-10-CM

## 2023-10-09 DIAGNOSIS — M54.16 LUMBAR RADICULOPATHY: ICD-10-CM

## 2023-10-09 DIAGNOSIS — Z79.891 CHRONIC PRESCRIPTION OPIATE USE: Primary | ICD-10-CM

## 2023-10-09 DIAGNOSIS — F41.8 SITUATIONAL ANXIETY: ICD-10-CM

## 2023-10-09 PROCEDURE — 99442 PR PHYSICIAN TELEPHONE EVALUATION 11-20 MIN: CPT | Mod: 95 | Performed by: FAMILY MEDICINE

## 2023-10-09 RX ORDER — AMLODIPINE BESYLATE 5 MG/1
1 TABLET ORAL DAILY
COMMUNITY
Start: 2023-09-14

## 2023-10-09 RX ORDER — OXYCODONE AND ACETAMINOPHEN 10; 325 MG/1; MG/1
.5-1 TABLET ORAL EVERY 8 HOURS PRN
Qty: 45 TABLET | Refills: 0 | Status: SHIPPED | OUTPATIENT
Start: 2023-10-09

## 2023-10-09 RX ORDER — LORAZEPAM 1 MG/1
1 TABLET ORAL DAILY PRN
Qty: 3 TABLET | Refills: 0 | Status: SHIPPED | OUTPATIENT
Start: 2023-10-09

## 2023-10-09 NOTE — PROGRESS NOTES
"Herson is a 83 year old who is being evaluated via a billable telephone visit.      What phone number would you like to be contacted at? [phone]- Wife's number.  How would you like to obtain your AVS? Tracy    Distant Location (provider location):  On-site    Assessment & Plan     Chronic prescription opiate use  Increased use due to \"walking more' for exercise.  Will still allow up to twice a day use on days he walks.  Patient said he does not take medication on days he does not walk; he could not quantify how many days a week average.  Advised he should see pain clinic provider in [place] for future refills as he is leaving in 3 days.  Advised use of narcan.  - oxyCODONE-acetaminophen (PERCOCET)  MG per tablet  Dispense: 45 tablet; Refill: 0  - naloxone (NARCAN) 4 MG/0.1ML nasal spray  Dispense: 0.2 mL; Refill: 0    Lumbar radiculopathy  Chronic and waxing and waning, worsened by walking.  See above analgesic plan.  - oxyCODONE-acetaminophen (PERCOCET)  MG per tablet  Dispense: 45 tablet; Refill: 0  - naloxone (NARCAN) 4 MG/0.1ML nasal spray  Dispense: 0.2 mL; Refill: 0    Primary osteoarthritis involving multiple joints  See above.  - oxyCODONE-acetaminophen (PERCOCET)  MG per tablet  Dispense: 45 tablet; Refill: 0  - naloxone (NARCAN) 4 MG/0.1ML nasal spray  Dispense: 0.2 mL; Refill: 0    Situational anxiety  Advised in detail risk of combining opiate and benzos.  Patient has acknowledged that he should not mix them. He agreed multiple times verbally that he will not take opiate analgesic on the days he will be taking lorazepam for anxiety attacks, including pre-flight treatment.  - LORazepam (ATIVAN) 1 MG tablet  Dispense: 3 tablet; Refill: 0      Patient Instructions   Take percocet 10/325 mg 1 tablet 12 hours apart as needed for moderate to severe back pain  Do not drink alcohol with this medication.  Do not take percocet on the days you will take lorazepam including the day of your " flight.    Narcan is a rescue medication in case of respiratory depression due to opiate overdose. Discuss proper use with your pharmacist once you  the prescription..    Jack Smith MD  [facility]    Latoya Bains is a 83 year old, presenting for the following health issues:  Recheck Medication        9/15/2023     2:35 PM   Additional Questions   Roomed by kishor sotomayor   Accompanied by self       History of Present Illness       Reason for visit:  Pain medication refillHe consumes 2 sweetened beverage(s) daily.He exercises with enough effort to increase his heart rate 30 to 60 minutes per day.  He exercises with enough effort to increase his heart rate 5 days per week.   He is taking medications regularly.       Pain History:  When did you first notice your pain? Ongoing for years   Have you seen this provider for your pain in the past? Yes   Where in your body do you have pain? Lumbar osteoarthritis, Spinal stenosis   Are you seeing anyone else for your pain? Yes - In [place] he sees a family practice doctor and they send him to the pain clinic in [place].      Chronic Pain Follow Up:    Location of pain: low back  Analgesia/pain control:    - Recent changes:  increased pain since he has been told by cardiologist     - Overall control: Tolerable with discomfort    - Current treatments: oxycodone-APAP  mg  - taken 1 tablet 1-2 x a day on days he has been walking for exercise per cardiologist's request for him to do.  Adherence:     - Do you ever take more pain medicine than prescribed? No    - When did you take your last dose of pain medicine?     Adverse effects: No   PDMP Review         Value Time User    State PDMP site checked  Yes 10/9/2023  5:58 PM Jack Smith MD          Last CSA Agreement:   CSA -- Patient Level:     [Media Unavailable] Controlled Substance Agreement - Opioid - Scan on 7/28/2023  1:17 PM       Last UDS:       Patient sees pain  clinic in [place] who has prescribed percocet when he is there.    He has had back procedures/surgeries postponed due to cardiac disease.    He also requests lorazepam for treatment of fear of flying - severe anxiety attacks up to 2 days before flight.  Used to be given by previous PCP.      Review of Systems   Constitutional, HEENT, cardiovascular, pulmonary, GI, , musculoskeletal, neuro, skin, endocrine and psych systems are negative, except as otherwise noted.      Objective    Vitals - Patient Reported  Weight (Patient Reported): 82.6 kg (182 lb)  Pain Score: No Pain (0)        Physical Exam   alert and no distress - hard of hearing so spouse helped relay questions to patient   PSYCH: Alert and oriented times 3; coherent speech, normal   rate and volume, able to articulate logical thoughts, able   to abstract reason, no tangential thoughts, no hallucinations   or delusions  His affect is normal  RESP: No cough, no audible wheezing, able to talk in full sentences  Remainder of exam unable to be completed due to telephone visits    No results found for any visits on 10/09/23.            Phone call duration: 18 minutes

## 2023-10-09 NOTE — PATIENT INSTRUCTIONS
Take percocet 10/325 mg 1 tablet 12 hours apart as needed for moderate to severe back pain  Do not drink alcohol with this medication.  Do not take percocet on the days you will take lorazepam including the day of your flight.    Narcan is a rescue medication in case of respiratory depression due to opiate overdose. Discuss proper use with your pharmacist once you  the prescription..

## 2023-10-10 NOTE — TELEPHONE ENCOUNTER
RECORDS STATUS - ALL OTHER DIAGNOSIS      RECORDS RECEIVED FROM: [facility]    Appt Date: TBD NN WQ   NOTES STATUS DETAILS   OFFICE NOTE from referring provider     OPERATIVE REPORT Complete Echo Reports are in CE   MEDICATION LIST Complete EPIC   CLINICAL TRIAL TREATMENTS TO DATE     LABS     PATHOLOGY REPORTS     ANYTHING RELATED TO DIAGNOSIS Complete Labs last updated on 9/21/2023    GENONOMIC TESTING     TYPE:     IMAGING (NEED IMAGES & REPORT)     CT SCANS Complete- [facility] scans are in PACS CT Chest 10/1/2019   MRI     Xray Chest Complete- [facility] scans are in PACS 7/2/2021, 10/1/2019, 9/27/2019, 9/26/2019, 9/25/2019, 9/24/2019    ULTRASOUND     PET

## 2023-11-15 ENCOUNTER — MYC MEDICAL ADVICE (OUTPATIENT)
Dept: FAMILY MEDICINE | Facility: CLINIC | Age: 83
End: 2023-11-15
Payer: COMMERCIAL

## 2023-11-15 DIAGNOSIS — I25.10 CORONARY ARTERY DISEASE INVOLVING NATIVE HEART WITHOUT ANGINA PECTORIS, UNSPECIFIED VESSEL OR LESION TYPE: ICD-10-CM

## 2023-11-15 RX ORDER — ROSUVASTATIN CALCIUM 20 MG/1
20 TABLET, COATED ORAL DAILY
Qty: 90 TABLET | Refills: 2 | Status: SHIPPED | OUTPATIENT
Start: 2023-11-15

## 2023-11-15 NOTE — TELEPHONE ENCOUNTER
Prescription approved per [facility] Refill Protocol     Wilma Yusuf     RN MSN

## 2024-04-16 ENCOUNTER — NURSE TRIAGE (OUTPATIENT)
Dept: FAMILY MEDICINE | Facility: CLINIC | Age: 84
End: 2024-04-16
Payer: COMMERCIAL

## 2024-04-16 NOTE — TELEPHONE ENCOUNTER
Reason for Call:  Appointment Request    Patient requesting this type of appt: other     Requested provider:  Jack Smith MD    Reason patient unable to be scheduled: Not within requested timeframe    When does patient want to be seen/preferred time: 3-7 days    Comments: blood in stool and fatigue     [facility] or would you prefer to receive a phone call?:   Patient would prefer a phone call   Okay to leave a detailed message?: Yes at Other phone number:  spouse [phone]    Call taken on 4/16/2024 at 1:58 PM by Shira Gamez

## 2024-04-17 NOTE — TELEPHONE ENCOUNTER
Reviewed triage note.  Patient has hematochezia and now with darker colored urine. He also has had dizziness which could be a symptom of excessive loss of blood and his hgb could be very low.  Patient will need more immediate work up to ensure he is not actively bleeding in his GI tract and kidneys. That work up cannot be all completed in adequate time in the clinic. Patient is advised to be seen in ER today.

## 2024-04-17 NOTE — TELEPHONE ENCOUNTER
"Nurse Triage SBAR    Is this a 2nd Level Triage? YES, LICENSED PRACTITIONER REVIEW IS REQUIRED    Situation: rectal bleeding & now blood in urine since Monday    Background: pt on xarelto. Has been passing small amt of bright red blood rectally with or without a BM that pt wipes with a tissue. Has had small hemorrhoids in the past. Has had \"episodes\" of passing blood rectally for the past few mos. & the last big episode was Monday. Pt got up to urinate, felt pressure like he needed to have a BM & sat down & passed \"a lot of blood\" told wife \"it was squirting out\". Tried to stop with tissues & it filled the tissues. He did this several times. Finally stopped after 1/2 hr. Pt felt dizzy afterward.   States for past few mos pt \"always\" passes a little blood & wipes with a tissue & it stops. When pt has the large episodes his wife will have him take iron pills & he tries to walk around a bit & feels a little better.   This morning wife emptied urinal & noted urine color was red/dark pink with blood. No clots.   Has some constipation but thinks its because of the iron pills    Assessment: pt has no pain, vomiting. Has occ dizziness since Monday. Slight dizzy today, didn't sleep well. Blood in urine today, no clots. Slight rectal bleeding on tissue. Unknown if passing clots rectally. Blood in urine.  Protocol Recommended Disposition:   See in Office Today    Recommendation: wife not with pt. She is with her dtr. Pt & wife are in [place], [place] with dtr & will be home to [place] tomorrow.   Protocol advises to be seen today in ER. Wife refuses & states they only want to see Dr Smith & it would have to be tomorrow. She doesn't want to take pt to ER in [facility] today. States pt will refuse ER. Wife states she could get pt in tomorrow to the clinic. Again states she will not bring pt in to an ER today as they are not at home.  Wants msg sent to provider.  Routing to Dr Smith for review/recommendation.     Routed to " "provider    Does the patient meet one of the following criteria for ADS visit consideration? No       1. APPEARANCE of BLOOD: \"What color is it?\" \"Is it passed separately, on the surface of the stool, or mixed in with the stool?\"       2 nights ago (Monday stuart) got up to urinate & had pressure like needed to have BM & sat down & passed red blood. This has happened in the past. Also happened few mos ago(smaller episode).   2. AMOUNT: \"How much blood was passed?\"       Mod amt, pt describes \"squirting\" blood. Tried to stop with tissues & the tissues would fill up & hed flush them, then would get more tissues & they would fill up with blood. Finally stopped after 1/2 hr. Felt dizzy afterward.  3. FREQUENCY: \"How many times has blood been passed with the stools?\"       No stool was passed with the blood.  4. ONSET: \"When was the blood first seen in the stools?\" (Days or weeks)       Every time pt goes to the bathroom whether to have BM or urinate there is blood both in urine & in the toilet after stool  5. DIARRHEA: \"Is there also some diarrhea?\" If Yes, ask: \"How many diarrhea stools in the past 24 hours?\"       No   6. CONSTIPATION: \"Do you have constipation?\" If Yes, ask: \"How bad is it?\"      Yes. Have been giving iron pills  7. RECURRENT SYMPTOMS: \"Have you had blood in your stools before?\" If Yes, ask: \"When was the last time?\" and \"What happened that time?\"       Always has rectal bleeding, small amt on tissue when wiping for past several mos  8. BLOOD THINNERS: \"Do you take any blood thinners?\" (e.g., Coumadin/warfarin, Pradaxa/dabigatran, aspirin)      Yes, pt is on blood thinner xarelto  9. OTHER SYMPTOMS: \"Do you have any other symptoms?\"  (e.g., abdomen pain, vomiting, dizziness, fever)      No pain. No vomiting. +dizziness but not all the time. Is occ.  10. PREGNANCY: \"Is there any chance you are pregnant?\" \"When was your last menstrual period?\"        N/a  Reason for Disposition   MODERATE rectal bleeding " "(small blood clots, passing blood without stool, or toilet water turns red)   Taking Coumadin (warfarin) or other strong blood thinner, or known bleeding disorder (e.g., thrombocytopenia)   MODERATE rectal bleeding (small blood clots, passing blood without stool, or toilet water turns red) more than once a day    Answer Assessment - Initial Assessment Questions  1. APPEARANCE of BLOOD: \"What color is it?\" \"Is it passed separately, on the surface of the stool, or mixed in with the stool?\"       2 nights ago (Monday stuart) got up to urinate & had pressure like needed to have BM & sat down & passed red blood. This has happened in the past. Also happened few mos ago(smaller episode).   2. AMOUNT: \"How much blood was passed?\"       Mod amt, pt describes \"squirting\" blood. Tried to stop with tissues & the tissues would fill up & hed flush them, then would get more tissues & they would fill up with blood. Finally stopped after 1/2 hr. Felt dizzy afterward.  3. FREQUENCY: \"How many times has blood been passed with the stools?\"       No stool was passed with the blood.  4. ONSET: \"When was the blood first seen in the stools?\" (Days or weeks)       Every time pt goes to the bathroom whether to have BM or urinate there is blood both in urine & in the toilet after stool  5. DIARRHEA: \"Is there also some diarrhea?\" If Yes, ask: \"How many diarrhea stools in the past 24 hours?\"       No   6. CONSTIPATION: \"Do you have constipation?\" If Yes, ask: \"How bad is it?\"      Yes. Have been giving iron pills  7. RECURRENT SYMPTOMS: \"Have you had blood in your stools before?\" If Yes, ask: \"When was the last time?\" and \"What happened that time?\"       Always has rectal bleeding, small amt on tissue when wiping for past several mos  8. BLOOD THINNERS: \"Do you take any blood thinners?\" (e.g., Coumadin/warfarin, Pradaxa/dabigatran, aspirin)      Yes, pt is on blood thinner xarelto  9. OTHER SYMPTOMS: \"Do you have any other symptoms?\"  (e.g., " "abdomen pain, vomiting, dizziness, fever)      No pain. No vomiting. +dizziness but not all the time. Is occ.  10. PREGNANCY: \"Is there any chance you are pregnant?\" \"When was your last menstrual period?\"        N/a    Protocols used: Rectal Bleeding-A-[place]    "

## 2024-04-17 NOTE — TELEPHONE ENCOUNTER
"Reason for Disposition    MODERATE rectal bleeding (small blood clots, passing blood without stool, or toilet water turns red) more than once a day    Answer Assessment - Initial Assessment Questions  1. APPEARANCE of BLOOD: \"What color is it?\" \"Is it passed separately, on the surface of the stool, or mixed in with the stool?\"       2 nights ago (Monday stuart) got up to urinate & had pressure like needed to have BM & sat down & passed red blood. This has happened in the past. Also happened few mos ago(smaller episode).   2. AMOUNT: \"How much blood was passed?\"       Mod amt, pt describes \"squirting\" blood. Tried to stop with tissues & the tissues would fill up & hed flush them, then would get more tissues & they would fill up with blood. Finally stopped after 1/2 hr. Felt dizzy afterward.  3. FREQUENCY: \"How many times has blood been passed with the stools?\"       No stool was passed with the blood.  4. ONSET: \"When was the blood first seen in the stools?\" (Days or weeks)       Every time pt goes to the bathroom whether to have BM or urinate there is blood both in urine & in the toilet after stool  5. DIARRHEA: \"Is there also some diarrhea?\" If Yes, ask: \"How many diarrhea stools in the past 24 hours?\"       No   6. CONSTIPATION: \"Do you have constipation?\" If Yes, ask: \"How bad is it?\"      Yes. Have been giving iron pills  7. RECURRENT SYMPTOMS: \"Have you had blood in your stools before?\" If Yes, ask: \"When was the last time?\" and \"What happened that time?\"       Always has rectal bleeding, small amt on tissue when wiping for past several mos  8. BLOOD THINNERS: \"Do you take any blood thinners?\" (e.g., Coumadin/warfarin, Pradaxa/dabigatran, aspirin)      Yes, pt is on blood thinner xarelto  9. OTHER SYMPTOMS: \"Do you have any other symptoms?\"  (e.g., abdomen pain, vomiting, dizziness, fever)      No pain. No vomiting. +dizziness but not all the time. Is occ.  10. PREGNANCY: \"Is there any chance you are pregnant?\" " "\"When was your last menstrual period?\"        N/a    Protocols used: Rectal Bleeding-A-[place]    "

## 2024-04-17 NOTE — TELEPHONE ENCOUNTER
Called pt/wife. Pt is very hard of hearing so talked with wife, Lashay. Read providers message. Again advised ER. Lashay will try & convince pt to go to the ER today. They are just leaving [place], [place] & driving back to [place]. Pt was adamant that he not go to ER.   Lashay will try & convince pt on the way home to be seen in ER.   Advised 911 is available if pt dizzy, passing blood in urine or rectally.  Wife agrees & states will hopefully get pt to ER once home.    Grace Bhardwaj RN

## 2024-05-26 ENCOUNTER — HEALTH MAINTENANCE LETTER (OUTPATIENT)
Age: 84
End: 2024-05-26

## 2024-08-05 ENCOUNTER — LAB (OUTPATIENT)
Dept: LAB | Facility: CLINIC | Age: 84
End: 2024-08-05
Payer: COMMERCIAL

## 2024-08-05 DIAGNOSIS — F11.90 CHRONIC, CONTINUOUS USE OF OPIOIDS: Primary | ICD-10-CM

## 2024-08-05 DIAGNOSIS — G89.4 PAIN SYNDROME, CHRONIC: ICD-10-CM

## 2024-08-05 DIAGNOSIS — F11.90 CHRONIC, CONTINUOUS USE OF OPIOIDS: ICD-10-CM

## 2024-08-05 PROCEDURE — 80306 DRUG TEST PRSMV INSTRMNT: CPT

## 2024-08-05 PROCEDURE — 80307 DRUG TEST PRSMV CHEM ANLYZR: CPT

## 2024-08-06 LAB
AMPHETAMINES UR QL: NOT DETECTED
BARBITURATES UR QL SCN: NOT DETECTED
BENZODIAZ UR QL SCN: NOT DETECTED
BUPRENORPHINE UR QL: NOT DETECTED
CANNABINOIDS UR QL: NOT DETECTED
COCAINE UR QL SCN: NOT DETECTED
D-METHAMPHET UR QL: NOT DETECTED
ETHANOL UR QL SCN: NORMAL
METHADONE UR QL SCN: NOT DETECTED
OPIATES UR QL SCN: NOT DETECTED
OXYCODONE UR QL SCN: DETECTED
PCP UR QL SCN: NOT DETECTED
TRICYCLICS UR QL SCN: NOT DETECTED

## 2025-06-14 ENCOUNTER — HEALTH MAINTENANCE LETTER (OUTPATIENT)
Age: 85
End: 2025-06-14

## 2025-06-17 ENCOUNTER — TELEPHONE (OUTPATIENT)
Dept: FAMILY MEDICINE | Facility: CLINIC | Age: 85
End: 2025-06-17
Payer: COMMERCIAL

## 2025-06-17 NOTE — TELEPHONE ENCOUNTER
Patient Quality Outreach    Patient is due for the following:   Physical Annual Wellness Visit      Topic Date Due    Zoster (Shingles) Vaccine (1 of 2) Never done    Pneumococcal Vaccine (2 of 2 - PCV) 03/19/2009    Diptheria Tetanus Pertussis (DTAP/TDAP/TD) Vaccine (2 - Td or Tdap) 05/19/2024    COVID-19 Vaccine (1 - 2024-25 season) Never done       Action(s) Taken:   Schedule a Annual Wellness Visit    Type of outreach:    Sent [employer] message.    Questions for provider review:    None         Mary Hills MA  Chart routed to None.

## 2025-06-19 NOTE — TELEPHONE ENCOUNTER
Pt viewed outreach via [employer]. Closing encounter.  Mary Hills MA on 6/19/2025 at 4:06 PM

## 2025-08-30 ENCOUNTER — MYC REFILL (OUTPATIENT)
Dept: FAMILY MEDICINE | Facility: CLINIC | Age: 85
End: 2025-08-30
Payer: COMMERCIAL

## 2025-08-30 DIAGNOSIS — I25.10 CORONARY ARTERY DISEASE INVOLVING NATIVE HEART WITHOUT ANGINA PECTORIS, UNSPECIFIED VESSEL OR LESION TYPE: Primary | ICD-10-CM

## 2025-08-30 DIAGNOSIS — I25.10 CORONARY ARTERY DISEASE INVOLVING NATIVE HEART WITHOUT ANGINA PECTORIS, UNSPECIFIED VESSEL OR LESION TYPE: ICD-10-CM

## 2025-09-02 RX ORDER — CARVEDILOL 12.5 MG/1
12.5 TABLET ORAL 2 TIMES DAILY
OUTPATIENT
Start: 2025-09-02

## 2025-09-02 RX ORDER — AMLODIPINE BESYLATE 5 MG/1
5 TABLET ORAL DAILY
OUTPATIENT
Start: 2025-09-02

## 2025-09-02 RX ORDER — ROSUVASTATIN CALCIUM 20 MG/1
20 TABLET, COATED ORAL DAILY
Qty: 90 TABLET | Refills: 2 | OUTPATIENT
Start: 2025-09-02

## 2025-09-03 RX ORDER — AMLODIPINE BESYLATE 5 MG/1
5 TABLET ORAL DAILY
Qty: 30 TABLET | Refills: 0 | Status: SHIPPED | OUTPATIENT
Start: 2025-09-03

## 2025-09-03 RX ORDER — ROSUVASTATIN CALCIUM 20 MG/1
20 TABLET, COATED ORAL DAILY
Qty: 30 TABLET | Refills: 0 | Status: SHIPPED | OUTPATIENT
Start: 2025-09-03

## 2025-09-03 RX ORDER — CARVEDILOL 12.5 MG/1
12.5 TABLET ORAL 2 TIMES DAILY
Qty: 60 TABLET | Refills: 0 | Status: SHIPPED | OUTPATIENT
Start: 2025-09-03

## (undated) DEVICE — BLADE CLIPPER 4406

## (undated) DEVICE — SOL NACL 0.9% IRRIG 1000ML BOTTLE 07138-09

## (undated) DEVICE — ESU ENDO SCISSORS 5MM CVD 5DCS

## (undated) DEVICE — Device

## (undated) DEVICE — DECANTER VIAL 2006S

## (undated) DEVICE — SOL NACL 0.9% INJ 1000ML BAG 07983-09

## (undated) DEVICE — SOL WATER IRRIG 1000ML BOTTLE 07139-09

## (undated) DEVICE — GOWN XLG DISP 9545

## (undated) DEVICE — GLOVE PROTEXIS W/NEU-THERA 7.5  2D73TE75

## (undated) DEVICE — ENDO TROCAR BLUNT TIP KII BALLOON ADV FIX 12X100MM C0K11

## (undated) DEVICE — ADH SKIN CLOSURE PREMIERPRO EXOFIN 1.0ML 3470

## (undated) DEVICE — SU VICRYL 0 UR-6 27" J603H

## (undated) DEVICE — SU VICRYL 4-0 FS-2 27" J422-H

## (undated) DEVICE — PREP CHLORAPREP 26ML TINTED ORANGE  260815

## (undated) DEVICE — STOCKING SLEEVE COMPRESSION CALF MED

## (undated) RX ORDER — CEFAZOLIN SODIUM 2 G/100ML
INJECTION, SOLUTION INTRAVENOUS
Status: DISPENSED
Start: 2020-07-07

## (undated) RX ORDER — HYDRALAZINE HYDROCHLORIDE 20 MG/ML
INJECTION INTRAMUSCULAR; INTRAVENOUS
Status: DISPENSED
Start: 2020-07-07

## (undated) RX ORDER — HYDROCODONE BITARTRATE AND ACETAMINOPHEN 5; 325 MG/1; MG/1
TABLET ORAL
Status: DISPENSED
Start: 2020-07-07

## (undated) RX ORDER — ACETAMINOPHEN 325 MG/1
TABLET ORAL
Status: DISPENSED
Start: 2020-07-07

## (undated) RX ORDER — FENTANYL CITRATE 50 UG/ML
INJECTION, SOLUTION INTRAMUSCULAR; INTRAVENOUS
Status: DISPENSED
Start: 2020-07-07

## (undated) RX ORDER — HYDROMORPHONE HYDROCHLORIDE 1 MG/ML
INJECTION, SOLUTION INTRAMUSCULAR; INTRAVENOUS; SUBCUTANEOUS
Status: DISPENSED
Start: 2020-07-07